# Patient Record
Sex: MALE | Race: WHITE | NOT HISPANIC OR LATINO | Employment: OTHER | URBAN - METROPOLITAN AREA
[De-identification: names, ages, dates, MRNs, and addresses within clinical notes are randomized per-mention and may not be internally consistent; named-entity substitution may affect disease eponyms.]

---

## 2021-03-02 ENCOUNTER — TRANSFERRED RECORDS (OUTPATIENT)
Dept: HEALTH INFORMATION MANAGEMENT | Facility: CLINIC | Age: 61
End: 2021-03-02

## 2022-09-07 ENCOUNTER — TRANSFERRED RECORDS (OUTPATIENT)
Dept: HEALTH INFORMATION MANAGEMENT | Facility: CLINIC | Age: 62
End: 2022-09-07

## 2022-10-14 ENCOUNTER — PRE VISIT (OUTPATIENT)
Dept: ONCOLOGY | Facility: CLINIC | Age: 62
End: 2022-10-14

## 2022-10-14 ENCOUNTER — PATIENT OUTREACH (OUTPATIENT)
Dept: ONCOLOGY | Facility: CLINIC | Age: 62
End: 2022-10-14

## 2022-10-14 ENCOUNTER — TRANSCRIBE ORDERS (OUTPATIENT)
Dept: OTHER | Age: 62
End: 2022-10-14

## 2022-10-14 DIAGNOSIS — C91.10 CLL (CHRONIC LYMPHOCYTIC LEUKEMIA) (H): Primary | ICD-10-CM

## 2022-10-14 NOTE — PROGRESS NOTES
RECORDS STATUS - ALL OTHER DIAGNOSIS      RECORDS RECEIVED FROM: [facility]/ [facility] / [facility]    DATE RECEIVED:11/2/2022    Action    Action Taken 10/14/2022 2:50pm KEB     I called pt Familia - unable to reach pt..     I called [facility] [phone] - I spoke to Yanni in medical records and she will fax records over! They have a [facility] provider in his [facility] records- FRANCK isn't necessary     3:39pm KEB   I faxed [facility] recs to HIM     I called [facility] in [place]      NOTES STATUS DETAILS   OFFICE NOTE from referring provider Self  Dx: CLL, 2nd opinion   Ref to: Dr Kaycee Alaniz / [facility] Onc   Ref by: self-referred   Records: [facility]        OFFICE NOTE from medical oncologist Complete  - [facility]  He started at [facility] back in 2021- 2022   DISCHARGE SUMMARY from hospital     DISCHARGE REPORT from the ER     OPERATIVE REPORT     CLINICAL TRIAL TREATMENTS TO DATE     LABS     PATHOLOGY REPORTS     ANYTHING RELATED TO DIAGNOSIS Complete CE- Labs last updated on 9/8/2022   GENONOMIC TESTING     TYPE:     IMAGING (NEED IMAGES & REPORT)     CT SCANS Complete -[facility] Chest Abdomen Pelvis 9/7/2022   MRI Complete -[facility]   MRI Cervical Spine 9/7/2022   MAMMO     ULTRASOUND     PET

## 2022-10-14 NOTE — PROGRESS NOTES
October 14, 2022    Hematology/Oncology  referral reviewed.   Referral Details    Referred By  Referred To   Call from patient  Dx: CLL, 2nd opinion  Ref to: Dr Kaycee Alaniz / [facility]  Ref by: self-referred  Records: [facility]         Diagnoses: CLL (chronic lymphocytic leukemia) (H)   Order: Adult Oncology/Hematology  Referral    [facility]     Comment: Call from patient       October 18, 2022     records recd by MR team    OUTGOING CALL to pt, Attempt to reach pt x 2, phone rings busy.  Hematology intake scheduling team (NPS phone number [phone] Monday - Friday 8am - 4:30 pm) will contact pt in the next 1-2 business days to schedule the consultation, informing pt that Dr Alaniz no longer sees new pts in [facility] , other provider options are available.    Anna Matthew, RN, BSN, OCN  [facility] Hematology/Oncology Nurse Navigator  [phone]

## 2022-10-31 PROBLEM — C91.10 CLL (CHRONIC LYMPHOCYTIC LEUKEMIA) (H): Status: ACTIVE | Noted: 2022-10-31

## 2022-10-31 NOTE — PROGRESS NOTES
REASON FOR CONSULTATION:  I was asked to see Familia Danielle for recommendations regarding CLL.    HISTORY OF PRESENT ILLNESS:  To review, in February of 2021, a CBC was completed for a long-term physical, which unexpectedly showed an elevated WBC count to 57K, predominantly lymphocytes. Flow cytometry was consistent with CLL, and CT CAP showed no adenopathy or HSM. FISH showed normal cytogenetics. He was diagnosed with Mehta stage 0 CLL, and observed every 3-6 months through September of 2022. His WBC count over that year and a half ranged from 60s-70s. His Hgb and plts were within the normal range, save for a solitary platelet reading of 140 on 9/8/21. He didn't not have particular symptoms related to CLL. In September 2022, his WBC count jumped to 93, and there was concern for progressive fatigue. His Hgb and plts remained unchanged. At that time, there was discussion of initiation of BTKi or Obi/Fer if WBC count or other symptoms worsened. He is here to review his case.    Interval History:  Familia is here today with his girlfriend Jazmine.  He says that overall he is feeling very well.  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  He has no limitations in his activity at all, and has not had any early satiety, weight loss, night sweats, lymphadenopathy or other concerning symptoms.  He is here today to learn more about CLL and obtain a second opinion about treatment.    REVIEW OF SYSTEMS:  A complete review of systems was negative other than noted.    PAST MEDICAL HISTORY:  CLL    MEDICATIONS:  Current Outpatient Medications   Medication     aspirin (ASA) 81 MG EC tablet     Green tea extract     vitamin C (ASCORBIC ACID) 1000 MG TABS     vitamin D3 (CHOLECALCIFEROL) 50 mcg (2000 units) tablet     No current facility-administered medications for this  "visit.     FAMILY HISTORY:  Father with prostate cancer. Diabetes. No other cancer history    SOCIAL HISTORY:  Previous tobacco use (chew), quit 2016. 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    PHYSICAL EXAMINATION:  BP (!) 143/87 (BP Location: Right arm, Patient Position: Sitting, Cuff Size: Adult Large)   Pulse 70   Temp 97.8  F (36.6  C) (Oral)   Ht 1.873 m (6' 1.74\")   Wt 108 kg (238 lb)   SpO2 100%   BMI 30.77 kg/m    Gen: Well appearing, in NAD  HEENT: EOMI, PERRL, mmm, oropharynx clear  LAD: one 1-2cm palpable anterior cervical lymph node on the R. Otherwise no cervical, supraclavicular, axillary or inguinal lymphadenopathy.  CV: Normal rate, regular rhythm. No m/r/g  Pulm: CTAB, no wheezing, normal work of breathing  Abd: Soft, nt/nd, no rebound/guarding  Ext: Warm and well perfused. No lower extremity edema  Skin: No rash, cyanosis or petechial lesion  Neuro: Alert and answering questions appropriately. CNII-XII grossly intact. Moving all extremities without issue or focal neurologic deficits.    Performance status: ECOG 0    LABORATORY DATA:  Previous lab data from [facility] reviewed. CBC from 9/7/22 showed WBC of 93, Hgb 14.4, plts 151. Diff showed 94.9% lymphocytes, with smudge cells.     Recent Labs   Lab Test 11/02/22  1231   WBC 90.3*   HGB 14.7        Recent Labs   Lab Test 11/02/22  1231      POTASSIUM 4.9   CHLORIDE 105   CO2 25   BUN 18.9   CR 1.09   PANFILO 9.1     Recent Labs   Lab Test 11/02/22  1231   AST 26   ALT 13   ALKPHOS 76   BILITOTAL 1.0     IMAGING DATA:  Reviewed in CareEverywhere    IMPRESSION AND PLAN:  Familia Danielle is a 62 year old man with a history of CLL, here for evaluation of possible CLL treatments.    #CLL, Mehta stage 0  Initially diagnosed incidentally in 2/2021 when noted to have lymphocytosis on routine labs. Subsequent flow was consistent with CLL, and CT CAP at that time showed no adenopathy. He has been observed since that " time, with WBC count roughly 60-70s. September of this year (2022), he had a jump in his WBC count to the 90s (95% lymphocytes), and this count is stable today.  He has no symptoms or limitations to speak of, and no adenopathy or other concerning features on exam or labs.  We had a lengthy discussion about the natural history of CLL, as well as indications for treatment versus active surveillance.  At this point, there does not appear to be an indication for treatment, despite a slightly higher lymphocyte count.  We will plan to continue with active surveillance with labs and visits every 3 months for now.  Should he develop cytopenias, or symptoms that warrant treatment, we we will plan to have a discussion about the first line options, including BTK inhibitors and obinutuzumab/venetoclax.    #ID  He has no active infectious issues. He had COVID in 2/2022. We discussed the importance of following up to date local and federal health agency guidance for immunocompromised individuals regarding measures to reduce the risk of COVID-19.  He completed a COVID-19 vaccine (J&J), and we recommended at bivalent booster, along with a flu shot, which he will get today. He completed the Prevnar 20, and we encouraged him to get the Shingrix series at his convenience  .   #Health Maintenance  He will continue to follow with Dr. Marks for primary care (most recent visit 9/8/22).     Follow-up: RV in 3 months with labs prior.     Patient seen and staffed with Dr. Carolina.    I spent 60 minutes in the care of this patient today, which included time necessary for preparation for the visit, obtaining history, ordering medications/tests/procedures as medically indicated, review of pertinent medical literature, counseling of the patient, communication of recommendations to the care team, and documentation time.    Miles Nguyen MD PhD  Heme/Onc/Transplant Fellow  Pgr #[id number]    ATTENDING ATTESTATION:  The patient was seen and  evaluated by me.  I have reviewed the vital signs, medications, labs, and imaging results independently, and have discussed the patient and plan with the resident/fellow, and agree with the findings and plan outlined in this note.  The impression and plan were jointly made.    Mr. Danielle is a 62 year old man with chronic lymphocytic leukemia (CLL).  To summarize his course from the limited records available, he was incidentally noted to have leukocytosis/lymphocytosis with WBC 57 x 10^9/L with normal hemoglobin and plateles around 02/2021.  There were no recurrent CLL associated cytogenetic abnormalities by FISH.  No obvious lymphadenopathy or splenomegaly.  CT chest abdomen and pelvis in 03/2021 was unremarkable.  Clinically Mehta Stage was 0.  Mild fatigue but no obvious CLL complications.  He was monitored without treatment.  Lymphocytosis has progressed up to 90 x 10^9/L range while hemoglobin and platelets have remained normal.  He has been discussing CLL directed therapy with Dr. Deysi Campbell.  Visit for another opinion regarding management of CLL.    He was with SO Jazmine    Plan:     We reviewed his diagnosis and course and general features of CLL and indications for treatment that is palliative short of BMT that is not often indicated    Some progression of lymphocytosis but no other symptoms or complication of CLL    No urgent indication for treatment, we agreed that active surveillance is the best plan for now - could consider BTK inhibitor of venetoclax and CD20 antibody if/when treatment is indicated    He would like to transfer care to our clinic    He got a bivalent COVID booster and flu shot today, plan for Shingrix at local pharmacy or upcoming visit, up to date for pneumococcal vaccines    He will continue to work with Dr. Bowen for health maintenance and other medical issues    Plan for labs and return visit in about 3-4 months    Provided contact info and reminded them to contact us if questions,  concerns, or new issues arise between visits    Total of 60 minutes on patient visit, reviewing records, interpreting test results, placing orders, and documentation on the day of service. +45 minutes reviewing records prior to date of service    Theodore Carolina MD, PhD  Attending Physician, [facility]   of Medicine, [facility]  Division of Hematology, Oncology, and Transplantation  [phone] clinic

## 2022-11-02 ENCOUNTER — ONCOLOGY VISIT (OUTPATIENT)
Dept: ONCOLOGY | Facility: CLINIC | Age: 62
End: 2022-11-02
Attending: INTERNAL MEDICINE
Payer: COMMERCIAL

## 2022-11-02 VITALS
OXYGEN SATURATION: 100 % | WEIGHT: 238 LBS | HEIGHT: 74 IN | TEMPERATURE: 97.8 F | SYSTOLIC BLOOD PRESSURE: 143 MMHG | BODY MASS INDEX: 30.54 KG/M2 | DIASTOLIC BLOOD PRESSURE: 87 MMHG | HEART RATE: 70 BPM

## 2022-11-02 DIAGNOSIS — C91.10 CLL (CHRONIC LYMPHOCYTIC LEUKEMIA) (H): Primary | ICD-10-CM

## 2022-11-02 DIAGNOSIS — Z23 ENCOUNTER FOR IMMUNIZATION: ICD-10-CM

## 2022-11-02 LAB
ALBUMIN SERPL BCG-MCNC: 4.5 G/DL (ref 3.5–5.2)
ALP SERPL-CCNC: 76 U/L (ref 40–129)
ALT SERPL W P-5'-P-CCNC: 13 U/L (ref 10–50)
ANION GAP SERPL CALCULATED.3IONS-SCNC: 10 MMOL/L (ref 7–15)
AST SERPL W P-5'-P-CCNC: 26 U/L (ref 10–50)
BASOPHILS # BLD MANUAL: 0 10E3/UL (ref 0–0.2)
BASOPHILS NFR BLD MANUAL: 0 %
BILIRUB SERPL-MCNC: 1 MG/DL
BUN SERPL-MCNC: 18.9 MG/DL (ref 8–23)
CALCIUM SERPL-MCNC: 9.1 MG/DL (ref 8.8–10.2)
CHLORIDE SERPL-SCNC: 105 MMOL/L (ref 98–107)
CREAT SERPL-MCNC: 1.09 MG/DL (ref 0.67–1.17)
DEPRECATED HCO3 PLAS-SCNC: 25 MMOL/L (ref 22–29)
EOSINOPHIL # BLD MANUAL: 0.9 10E3/UL (ref 0–0.7)
EOSINOPHIL NFR BLD MANUAL: 1 %
ERYTHROCYTE [DISTWIDTH] IN BLOOD BY AUTOMATED COUNT: 12.7 % (ref 10–15)
GFR SERPL CREATININE-BSD FRML MDRD: 76 ML/MIN/1.73M2
GLUCOSE SERPL-MCNC: 100 MG/DL (ref 70–99)
HCT VFR BLD AUTO: 44.4 % (ref 40–53)
HGB BLD-MCNC: 14.7 G/DL (ref 13.3–17.7)
LDH SERPL L TO P-CCNC: NORMAL U/L
LYMPHOCYTES # BLD MANUAL: 83.1 10E3/UL (ref 0.8–5.3)
LYMPHOCYTES NFR BLD MANUAL: 92 %
MCH RBC QN AUTO: 32.2 PG (ref 26.5–33)
MCHC RBC AUTO-ENTMCNC: 33.1 G/DL (ref 31.5–36.5)
MCV RBC AUTO: 97 FL (ref 78–100)
MONOCYTES # BLD MANUAL: 0.9 10E3/UL (ref 0–1.3)
MONOCYTES NFR BLD MANUAL: 1 %
NEUTROPHILS # BLD MANUAL: 5.4 10E3/UL (ref 1.6–8.3)
NEUTROPHILS NFR BLD MANUAL: 6 %
PLAT MORPH BLD: ABNORMAL
PLATELET # BLD AUTO: 159 10E3/UL (ref 150–450)
POTASSIUM SERPL-SCNC: 4.9 MMOL/L (ref 3.4–5.3)
PROT SERPL-MCNC: 7 G/DL (ref 6.4–8.3)
RBC # BLD AUTO: 4.56 10E6/UL (ref 4.4–5.9)
RBC MORPH BLD: ABNORMAL
SODIUM SERPL-SCNC: 140 MMOL/L (ref 136–145)
WBC # BLD AUTO: 90.3 10E3/UL (ref 4–11)

## 2022-11-02 PROCEDURE — 90682 RIV4 VACC RECOMBINANT DNA IM: CPT | Performed by: INTERNAL MEDICINE

## 2022-11-02 PROCEDURE — 36415 COLL VENOUS BLD VENIPUNCTURE: CPT | Performed by: INTERNAL MEDICINE

## 2022-11-02 PROCEDURE — 250N000011 HC RX IP 250 OP 636: Performed by: INTERNAL MEDICINE

## 2022-11-02 PROCEDURE — 83615 LACTATE (LD) (LDH) ENZYME: CPT | Performed by: INTERNAL MEDICINE

## 2022-11-02 PROCEDURE — 99205 OFFICE O/P NEW HI 60 MIN: CPT | Mod: GC | Performed by: INTERNAL MEDICINE

## 2022-11-02 PROCEDURE — 85045 AUTOMATED RETICULOCYTE COUNT: CPT | Performed by: INTERNAL MEDICINE

## 2022-11-02 PROCEDURE — 82040 ASSAY OF SERUM ALBUMIN: CPT | Performed by: INTERNAL MEDICINE

## 2022-11-02 PROCEDURE — G0008 ADMIN INFLUENZA VIRUS VAC: HCPCS | Performed by: INTERNAL MEDICINE

## 2022-11-02 PROCEDURE — G0463 HOSPITAL OUTPT CLINIC VISIT: HCPCS

## 2022-11-02 PROCEDURE — 80053 COMPREHEN METABOLIC PANEL: CPT | Performed by: INTERNAL MEDICINE

## 2022-11-02 PROCEDURE — 91312 HC RX IP 250 OP 636: CPT | Performed by: STUDENT IN AN ORGANIZED HEALTH CARE EDUCATION/TRAINING PROGRAM

## 2022-11-02 PROCEDURE — G0463 HOSPITAL OUTPT CLINIC VISIT: HCPCS | Mod: 25

## 2022-11-02 PROCEDURE — 85027 COMPLETE CBC AUTOMATED: CPT | Performed by: INTERNAL MEDICINE

## 2022-11-02 PROCEDURE — 250N000011 HC RX IP 250 OP 636: Performed by: STUDENT IN AN ORGANIZED HEALTH CARE EDUCATION/TRAINING PROGRAM

## 2022-11-02 PROCEDURE — 85007 BL SMEAR W/DIFF WBC COUNT: CPT | Performed by: INTERNAL MEDICINE

## 2022-11-02 PROCEDURE — 0124A HC ADMIN COVID VAC PFIZER 12+ BIVAL ADDITIONAL: CPT | Performed by: STUDENT IN AN ORGANIZED HEALTH CARE EDUCATION/TRAINING PROGRAM

## 2022-11-02 RX ORDER — CHOLECALCIFEROL (VITAMIN D3) 50 MCG
1 TABLET ORAL DAILY
COMMUNITY

## 2022-11-02 RX ORDER — MULTIVIT WITH MINERALS/LUTEIN
1000 TABLET ORAL DAILY
COMMUNITY

## 2022-11-02 RX ADMIN — INFLUENZA A VIRUS A/WISCONSIN/588/2019 (H1N1) RECOMBINANT HEMAGGLUTININ ANTIGEN, INFLUENZA A VIRUS A/DARWIN/6/2021 (H3N2) RECOMBINANT HEMAGGLUTININ ANTIGEN, INFLUENZA B VIRUS B/AUSTRIA/1359417/2021 RECOMBINANT HEMAGGLUTININ ANTIGEN, AND INFLUENZA B VIRUS B/PHUKET/3073/2013 RECOMBINANT HEMAGGLUTININ ANTIGEN 0.5 ML: 45; 45; 45; 45 INJECTION INTRAMUSCULAR at 12:37

## 2022-11-02 RX ADMIN — BNT162B2 ORIGINAL AND OMICRON BA.4/BA.5 30 MCG: .1125; .1125 INJECTION, SUSPENSION INTRAMUSCULAR at 12:37

## 2022-11-02 ASSESSMENT — PAIN SCALES - GENERAL: PAINLEVEL: NO PAIN (0)

## 2022-11-02 NOTE — NURSING NOTE
Immunizations Administered     Name Date Dose VIS Date Route    COVID-19,PF,Pfizer 12+ YRS BIVALENT Booster 11/2/22 12:37 PM 30 mcg EUA,08/31/2022,Given today Intramuscular    Influenza Quad, Recombinant, p-free 11/2/22 12:37 PM 0.5 mL 08/06/2021, Given Today Intramuscular        Bivalent and Flu shot administered today in clinic setting per Dr. Carolina's orders. Bivalent vaccine given in left deltoid. Flu vaccine given in right deltoid. Pt VIS given for flu, pt declined bivalent VIS. Consent signed and pt showed understanding.   Advised to wait 15 min post injection. Pt showed understanding. Injection was tolerated by patient.    Anna Yen, CMA on 11/2/2022 at 12:45 PM     DISPLAY PLAN FREE TEXT

## 2022-11-02 NOTE — NURSING NOTE
Venipuncture blood draw done on patients Right ac. Patient tolerated well without any complications. 21G needle used. Pt last name and  verified on specimen label and chart. Specimen sent to first floor lab. See flowsheets      Anna Yen CMA on 2022 at 12:42 PM

## 2022-11-02 NOTE — LETTER
11/2/2022         RE: Familia Danielle  [address] [facility] [address]        Dear Colleague,    Thank you for referring your patient, Familia Danielle, to the [facility]. Please see a copy of my visit note below.    REASON FOR CONSULTATION:  I was asked to see Familia Danielle for recommendations regarding CLL.    HISTORY OF PRESENT ILLNESS:  To review, in February of 2021, a CBC was completed for a residential physical, which unexpectedly showed an elevated WBC count to 57K, predominantly lymphocytes. Flow cytometry was consistent with CLL, and CT CAP showed no adenopathy or HSM. FISH showed normal cytogenetics. He was diagnosed with Mehta stage 0 CLL, and observed every 3-6 months through September of 2022. His WBC count over that year and a half ranged from 60s-70s. His Hgb and plts were within the normal range, save for a solitary platelet reading of 140 on 9/8/21. He didn't not have particular symptoms related to CLL. In September 2022, his WBC count jumped to 93, and there was concern for progressive fatigue. His Hgb and plts remained unchanged. At that time, there was discussion of initiation of BTKi or Obi/Fer if WBC count or other symptoms worsened. He is here to review his case.    Interval History:  Familia is here today with his girlfriend Jazmine.  He says that overall he is feeling very well.  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  He has no limitations in his activity at all, and has not had any early satiety, weight loss, night sweats, lymphadenopathy or other concerning symptoms.  He is here today to learn more about CLL and obtain a second opinion about treatment.    REVIEW OF SYSTEMS:  A complete review of systems was negative other than noted.    PAST MEDICAL HISTORY:  CLL    MEDICATIONS:  Current  "Outpatient Medications   Medication     aspirin (ASA) 81 MG EC tablet     Green tea extract     vitamin C (ASCORBIC ACID) 1000 MG TABS     vitamin D3 (CHOLECALCIFEROL) 50 mcg (2000 units) tablet     No current facility-administered medications for this visit.     FAMILY HISTORY:  Father with prostate cancer. Diabetes. No other cancer history    SOCIAL HISTORY:  Previous tobacco use (chew), quit 2016. 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    PHYSICAL EXAMINATION:  BP (!) 143/87 (BP Location: Right arm, Patient Position: Sitting, Cuff Size: Adult Large)   Pulse 70   Temp 97.8  F (36.6  C) (Oral)   Ht 1.873 m (6' 1.74\")   Wt 108 kg (238 lb)   SpO2 100%   BMI 30.77 kg/m    Gen: Well appearing, in NAD  HEENT: EOMI, PERRL, mmm, oropharynx clear  LAD: one 1-2cm palpable anterior cervical lymph node on the R. Otherwise no cervical, supraclavicular, axillary or inguinal lymphadenopathy.  CV: Normal rate, regular rhythm. No m/r/g  Pulm: CTAB, no wheezing, normal work of breathing  Abd: Soft, nt/nd, no rebound/guarding  Ext: Warm and well perfused. No lower extremity edema  Skin: No rash, cyanosis or petechial lesion  Neuro: Alert and answering questions appropriately. CNII-XII grossly intact. Moving all extremities without issue or focal neurologic deficits.    Performance status: ECOG 0    LABORATORY DATA:  Previous lab data from [facility] reviewed. CBC from 9/7/22 showed WBC of 93, Hgb 14.4, plts 151. Diff showed 94.9% lymphocytes, with smudge cells.     Recent Labs   Lab Test 11/02/22  1231   WBC 90.3*   HGB 14.7        Recent Labs   Lab Test 11/02/22  1231      POTASSIUM 4.9   CHLORIDE 105   CO2 25   BUN 18.9   CR 1.09   PANFILO 9.1     Recent Labs   Lab Test 11/02/22  1231   AST 26   ALT 13   ALKPHOS 76   BILITOTAL 1.0     IMAGING DATA:  Reviewed in CareEverywhere    IMPRESSION AND PLAN:  Familia Danielle is a 62 year old man with a history of CLL, here for evaluation of possible " CLL treatments.    #CLL, Mehta stage 0  Initially diagnosed incidentally in 2/2021 when noted to have lymphocytosis on routine labs. Subsequent flow was consistent with CLL, and CT CAP at that time showed no adenopathy. He has been observed since that time, with WBC count roughly 60-70s. September of this year (2022), he had a jump in his WBC count to the 90s (95% lymphocytes), and this count is stable today.  He has no symptoms or limitations to speak of, and no adenopathy or other concerning features on exam or labs.  We had a lengthy discussion about the natural history of CLL, as well as indications for treatment versus active surveillance.  At this point, there does not appear to be an indication for treatment, despite a slightly higher lymphocyte count.  We will plan to continue with active surveillance with labs and visits every 3 months for now.  Should he develop cytopenias, or symptoms that warrant treatment, we we will plan to have a discussion about the first line options, including BTK inhibitors and obinutuzumab/venetoclax.    #ID  He has no active infectious issues. He had COVID in 2/2022. We discussed the importance of following up to date local and federal health agency guidance for immunocompromised individuals regarding measures to reduce the risk of COVID-19.  He completed a COVID-19 vaccine (J&J), and we recommended at bivalent booster, along with a flu shot, which he will get today. He completed the Prevnar 20, and we encouraged him to get the Shingrix series at his convenience  .   #Health Maintenance  He will continue to follow with Dr. Marks for primary care (most recent visit 9/8/22).     Follow-up: RV in 3 months with labs prior.     Patient seen and staffed with Dr. Carolina.    I spent 60 minutes in the care of this patient today, which included time necessary for preparation for the visit, obtaining history, ordering medications/tests/procedures as medically indicated, review of pertinent  medical literature, counseling of the patient, communication of recommendations to the care team, and documentation time.    Miles Nguyen MD PhD  Heme/Onc/Transplant Fellow  Pgr #[id number]    ATTENDING ATTESTATION:  The patient was seen and evaluated by me.  I have reviewed the vital signs, medications, labs, and imaging results independently, and have discussed the patient and plan with the resident/fellow, and agree with the findings and plan outlined in this note.  The impression and plan were jointly made.    Mr. Danielle is a 62 year old man with chronic lymphocytic leukemia (CLL).  To summarize his course from the limited records available, he was incidentally noted to have leukocytosis/lymphocytosis with WBC 57 x 10^9/L with normal hemoglobin and plateles around 02/2021.  There were no recurrent CLL associated cytogenetic abnormalities by FISH.  No obvious lymphadenopathy or splenomegaly.  CT chest abdomen and pelvis in 03/2021 was unremarkable.  Clinically Mehta Stage was 0.  Mild fatigue but no obvious CLL complications.  He was monitored without treatment.  Lymphocytosis has progressed up to 90 x 10^9/L range while hemoglobin and platelets have remained normal.  He has been discussing CLL directed therapy with Dr. Deysi Campbell.  Visit for another opinion regarding management of CLL.    He was with SO Jazmine    Plan:     We reviewed his diagnosis and course and general features of CLL and indications for treatment that is palliative short of BMT that is not often indicated    Some progression of lymphocytosis but no other symptoms or complication of CLL    No urgent indication for treatment, we agreed that active surveillance is the best plan for now - could consider BTK inhibitor of venetoclax and CD20 antibody if/when treatment is indicated    He would like to transfer care to our clinic    He got a bivalent COVID booster and flu shot today, plan for Shingrix at local pharmacy or upcoming visit, up to date for  pneumococcal vaccines    He will continue to work with Dr. Bowen for health maintenance and other medical issues    Plan for labs and return visit in about 3-4 months    Provided contact info and reminded them to contact us if questions, concerns, or new issues arise between visits    Total of 60 minutes on patient visit, reviewing records, interpreting test results, placing orders, and documentation on the day of service. +45 minutes reviewing records prior to date of service    Theodore Carolina MD, PhD  Attending Physician, [facility]   of Medicine, [facility]  Division of Hematology, Oncology, and Transplantation  [phone] [facility]

## 2022-11-02 NOTE — NURSING NOTE
"Oncology Rooming Note    November 2, 2022 11:09 AM   Familia Danielle is a 62 year old male who presents for:    Chief Complaint   Patient presents with     Oncology Clinic Visit     New eval for CLL     Initial Vitals: BP (!) 143/87 (BP Location: Right arm, Patient Position: Sitting, Cuff Size: Adult Large)   Pulse 70   Temp 97.8  F (36.6  C) (Oral)   Ht 1.873 m (6' 1.74\")   Wt 108 kg (238 lb)   SpO2 100%   BMI 30.77 kg/m   Estimated body mass index is 30.77 kg/m  as calculated from the following:    Height as of this encounter: 1.873 m (6' 1.74\").    Weight as of this encounter: 108 kg (238 lb). Body surface area is 2.37 meters squared.  No Pain (0) Comment: Data Unavailable   No LMP for male patient.  Allergies reviewed: Yes  Medications reviewed: Yes    Medications: Medication refills not needed today.  Pharmacy name entered into EPIC: Data Unavailable    Clinical concerns: Patient has no specific questions or clinical concerns outside of the reason for the visit.       Donita Marques, EMT            "

## 2022-11-03 LAB
RETICS # AUTO: 0.07 10E6/UL (ref 0.03–0.1)
RETICS/RBC NFR AUTO: 1.6 % (ref 0.5–2)

## 2022-11-21 NOTE — NURSING NOTE
Met with patient, and SO/Jazmine. Introduced myself, answered questions and provided some basic information on my role as RN Care Coordinator with [facility]. Will continue to follow for ongoing needs. Given writers/clinic resource number.

## 2022-11-26 ENCOUNTER — HEALTH MAINTENANCE LETTER (OUTPATIENT)
Age: 62
End: 2022-11-26

## 2023-03-06 NOTE — PROGRESS NOTES
REASON FOR VISIT:  Management of chronic lymphocytic leukemia (CLL)    HISTORY OF PRESENT ILLNESS:  Familia Danielle is a 63 year old with chronic lymphocytic leukemia (CLL).  To summarize his course, he was incidentally noted to have leukocytosis/lymphocytosis with WBC 57 x 10^9/L with normal hemoglobin and plateles around 02/2021.  There were no recurrent CLL associated cytogenetic abnormalities by FISH.  No obvious lymphadenopathy or splenomegaly.  CT chest abdomen and pelvis in 03/2021 was unremarkable.  Clinically Mehta Stage was 0.  He had mild fatigue but no obvious CLL complications.  He was monitored without treatment.  Lymphocytosis has progressed up to 90 x 10^9/L range while hemoglobin and platelets remained normal.  He had been discussing CLL directed therapy with his primary CLL provider Dr. Deysi Campbell.  We saw him for another opinion and reviewed options and agreed to monitor.  Visit for management of CLL.    He is here with his SO Jazmine.  Had COVID in January and recovered with only mild symptoms. Energy level has been good.  No new night sweats or unintentional weight loss.  No headache or focal weakness or sensory changes.  No dyspnea, cough, or chest pain.  Normal bowel function.  No bleeding or unusual bruising.  No new lumps or bumps.  Had a great time in [place] for about a month.    PAST MEDICAL HISTORY:  No major medical history    MEDICATIONS:  Current Outpatient Medications   Medication     UNABLE TO FIND     vitamin C (ASCORBIC ACID) 1000 MG TABS     vitamin D3 (CHOLECALCIFEROL) 50 mcg (2000 units) tablet     aspirin (ASA) 81 MG EC tablet     No current facility-administered medications for this visit.     SOCIAL HISTORY:  Previous tobacco use (chew), quit 2016. 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    PHYSICAL EXAMINATION:  BP (!) 143/85   Pulse 67   Temp 98  F (36.7  C) (Oral)   Resp 16   Wt 108.6 kg (239 lb 8 oz)   SpO2 97%   BMI 30.97 kg/m    Wt Readings from Last 5  Encounters:   03/08/23 108.6 kg (239 lb 8 oz)   11/02/22 108 kg (238 lb)     General: Well appearing. No distress.  HEENT: Sclerae anicteric.  Lungs: Clear bilaterally without wheezing or crackles.  Heart: Regular rate and rhythm.  Gastrointestinal: Bowel sounds present, no tenderness to palpation, spleen tip not palpable.  Extremities: No lower extremity edema.  Lymph:  No cervical, clavicular, axillary, epitrochlear, or inguinal lymphadenopathy.  Performance status: ECOG 0    LABORATORY DATA: Reviewed by me  Recent Labs   Lab Test 03/08/23  0958 11/02/22  1231   WBC 95.2* 90.3*   HGB 14.5 14.7   * 159   ANEU  --  5.4   ALYM  --  83.1*   RETICABSCT  --  0.073     Recent Labs   Lab Test 03/08/23  0958 11/02/22  1231    140   POTASSIUM 5.2 4.9   CHLORIDE 102 105   CO2 28 25   BUN 17.9 18.9   CR 1.19* 1.09   PANFILO 9.5 9.1     Recent Labs   Lab Test 03/08/23  0958 11/02/22  1231   AST 32 26   ALT 18 13   ALKPHOS 74 76   BILITOTAL 1.0 1.0     IMPRESSION AND PLAN:  Familia Danielle is a 63 year old with chronic lymphocytic leukemia (CLL)    While the ALC has trended up in the past couple of years, hemoglobin and platelets remains normal, and there is no bothersome/threatening lymphadenopathy, symptoms/complications, or any other urgent indication for treatment.  Active surveillance is still a reasonable approach.  We will continue to monitor and he will let us know if anything comes up between visits.    He has no recurrent infections or active ID issues.  He has received Prevnar 20.  I recommended the Shingrix series at his convenience.  He is up to date for COVID-19 vaccines including a bivalent COVID booster.  He already got a seasonal flu shot.     He will continue to work with his primary care provider Dr. Leonard Marks for health maintenance and other medical issues.    We will arrange labs and another visit in about 4 months.  I reminded them to contact us if questions, concerns, or new issues come up between  visits.    Theodore Carolina MD, PhD  Attending Physician, [facility]   of Medicine, [facility]  Division of Hematology, Oncology, and Transplantation  [phone] clinic

## 2023-03-08 ENCOUNTER — PATIENT OUTREACH (OUTPATIENT)
Dept: ONCOLOGY | Facility: CLINIC | Age: 63
End: 2023-03-08

## 2023-03-08 ENCOUNTER — APPOINTMENT (OUTPATIENT)
Dept: LAB | Facility: CLINIC | Age: 63
End: 2023-03-08
Attending: INTERNAL MEDICINE
Payer: COMMERCIAL

## 2023-03-08 ENCOUNTER — ONCOLOGY VISIT (OUTPATIENT)
Dept: ONCOLOGY | Facility: CLINIC | Age: 63
End: 2023-03-08
Attending: INTERNAL MEDICINE
Payer: COMMERCIAL

## 2023-03-08 VITALS
BODY MASS INDEX: 30.97 KG/M2 | TEMPERATURE: 98 F | HEART RATE: 67 BPM | OXYGEN SATURATION: 97 % | SYSTOLIC BLOOD PRESSURE: 143 MMHG | DIASTOLIC BLOOD PRESSURE: 85 MMHG | RESPIRATION RATE: 16 BRPM | WEIGHT: 239.5 LBS

## 2023-03-08 DIAGNOSIS — C91.10 CLL (CHRONIC LYMPHOCYTIC LEUKEMIA) (H): Primary | ICD-10-CM

## 2023-03-08 LAB
ALBUMIN SERPL BCG-MCNC: 4.5 G/DL (ref 3.5–5.2)
ALP SERPL-CCNC: 74 U/L (ref 40–129)
ALT SERPL W P-5'-P-CCNC: 18 U/L (ref 10–50)
ANION GAP SERPL CALCULATED.3IONS-SCNC: 9 MMOL/L (ref 7–15)
AST SERPL W P-5'-P-CCNC: 32 U/L (ref 10–50)
BASOPHILS # BLD MANUAL: 0 10E3/UL (ref 0–0.2)
BASOPHILS NFR BLD MANUAL: 0 %
BILIRUB SERPL-MCNC: 1 MG/DL
BUN SERPL-MCNC: 17.9 MG/DL (ref 8–23)
CALCIUM SERPL-MCNC: 9.5 MG/DL (ref 8.8–10.2)
CHLORIDE SERPL-SCNC: 102 MMOL/L (ref 98–107)
CREAT SERPL-MCNC: 1.19 MG/DL (ref 0.67–1.17)
DEPRECATED HCO3 PLAS-SCNC: 28 MMOL/L (ref 22–29)
EOSINOPHIL # BLD MANUAL: 1 10E3/UL (ref 0–0.7)
EOSINOPHIL NFR BLD MANUAL: 1 %
ERYTHROCYTE [DISTWIDTH] IN BLOOD BY AUTOMATED COUNT: 12.9 % (ref 10–15)
GFR SERPL CREATININE-BSD FRML MDRD: 68 ML/MIN/1.73M2
GLUCOSE SERPL-MCNC: 113 MG/DL (ref 70–99)
HCT VFR BLD AUTO: 44.4 % (ref 40–53)
HGB BLD-MCNC: 14.5 G/DL (ref 13.3–17.7)
LDH SERPL L TO P-CCNC: 229 U/L (ref 0–250)
LYMPHOCYTES # BLD MANUAL: 85.7 10E3/UL (ref 0.8–5.3)
LYMPHOCYTES NFR BLD MANUAL: 90 %
MCH RBC QN AUTO: 31.7 PG (ref 26.5–33)
MCHC RBC AUTO-ENTMCNC: 32.7 G/DL (ref 31.5–36.5)
MCV RBC AUTO: 97 FL (ref 78–100)
MONOCYTES # BLD MANUAL: 2.9 10E3/UL (ref 0–1.3)
MONOCYTES NFR BLD MANUAL: 3 %
NEUTROPHILS # BLD MANUAL: 5.7 10E3/UL (ref 1.6–8.3)
NEUTROPHILS NFR BLD MANUAL: 6 %
PLAT MORPH BLD: ABNORMAL
PLATELET # BLD AUTO: 148 10E3/UL (ref 150–450)
POTASSIUM SERPL-SCNC: 5.2 MMOL/L (ref 3.4–5.3)
PROT SERPL-MCNC: 6.9 G/DL (ref 6.4–8.3)
RBC # BLD AUTO: 4.58 10E6/UL (ref 4.4–5.9)
RBC MORPH BLD: ABNORMAL
SODIUM SERPL-SCNC: 139 MMOL/L (ref 136–145)
TOTAL PROTEIN SERUM FOR ELP: 6.6 G/DL (ref 6.4–8.3)
WBC # BLD AUTO: 95.2 10E3/UL (ref 4–11)

## 2023-03-08 PROCEDURE — 80053 COMPREHEN METABOLIC PANEL: CPT | Performed by: INTERNAL MEDICINE

## 2023-03-08 PROCEDURE — 88184 FLOWCYTOMETRY/ TC 1 MARKER: CPT | Performed by: STUDENT IN AN ORGANIZED HEALTH CARE EDUCATION/TRAINING PROGRAM

## 2023-03-08 PROCEDURE — 88184 FLOWCYTOMETRY/ TC 1 MARKER: CPT | Performed by: INTERNAL MEDICINE

## 2023-03-08 PROCEDURE — 84165 PROTEIN E-PHORESIS SERUM: CPT | Mod: TC | Performed by: PATHOLOGY

## 2023-03-08 PROCEDURE — 84165 PROTEIN E-PHORESIS SERUM: CPT | Mod: 26

## 2023-03-08 PROCEDURE — 85027 COMPLETE CBC AUTOMATED: CPT | Performed by: INTERNAL MEDICINE

## 2023-03-08 PROCEDURE — 36415 COLL VENOUS BLD VENIPUNCTURE: CPT | Performed by: INTERNAL MEDICINE

## 2023-03-08 PROCEDURE — 85007 BL SMEAR W/DIFF WBC COUNT: CPT | Performed by: INTERNAL MEDICINE

## 2023-03-08 PROCEDURE — G0463 HOSPITAL OUTPT CLINIC VISIT: HCPCS | Performed by: INTERNAL MEDICINE

## 2023-03-08 PROCEDURE — 83615 LACTATE (LD) (LDH) ENZYME: CPT | Performed by: INTERNAL MEDICINE

## 2023-03-08 PROCEDURE — 84155 ASSAY OF PROTEIN SERUM: CPT | Performed by: INTERNAL MEDICINE

## 2023-03-08 PROCEDURE — 99214 OFFICE O/P EST MOD 30 MIN: CPT | Performed by: INTERNAL MEDICINE

## 2023-03-08 PROCEDURE — 88188 FLOWCYTOMETRY/READ 9-15: CPT | Performed by: STUDENT IN AN ORGANIZED HEALTH CARE EDUCATION/TRAINING PROGRAM

## 2023-03-08 ASSESSMENT — PAIN SCALES - GENERAL: PAINLEVEL: NO PAIN (0)

## 2023-03-08 NOTE — PROGRESS NOTES
[facility]: Cancer Care Initial Note                                    Discussion with Patient:                                                      Writer met with pt and SO during clinic visit to introduce self and role as RNCC.  Contact information provided for writer.       Goals        General     Functional (pt-stated)      Notes - Note created  3/8/2023  1:19 PM by Joan Flores RN     Goal Statement: I will use my clinic and care team resources as directed.  Date Goal set: 3/8/2023  Barriers: disease burden  Strengths: support, coping, motivation, health awareness, and involvement with care team  Date to Achieve By: ongoing  Patient expressed understanding of goal: Yes  Action steps to achieve this goal:  I will contact triage with new, worsening or uncontrolled symptoms. , I will call with difficulties of scheduling and/or transportation. , I will request needed refills when there are 7 days of medication remaining. , I will not send urgent or symptomatic messages through [facility]. , and I will contact scheduling to arrange or make changes in my appointments.              Assessment:                                                      Initial  Current living arrangement:: I live in a private home  Informal Support system:: Friends;Family;Significant other  Bed or wheelchair confined:: No  Mobility Status: Independent  Transportation means:: Regular car  Medication adherence problem (GOAL):: No  Knowledgeable about how to use meds:: Yes  Medication side effects suspected:: No  Referrals Placed: None  Patient Reported Pain?: No  Pain Score: No Pain (0)    Plan of Care Education Review:   Assessment completed with:: Patient    Current living arrangement  I live in a private home    Plan of Care Education   Yearly learning assessment completed?: Yes (see Education tab)  Diagnosis:: CLL  Does patient understand diagnosis?: Yes  Tx plan/regimen:: surveilance  Does patient understand treatment  plan/regimen?: Yes  Transportation means:: Regular car  Safety/self care at home reviewed with patient:: Yes  Informal Support system:: Friends;Family;Significant other  Coping - concerns/fears reviewed with patient:: Yes  Plan of Care:: Lab appointment;MD follow-up appointment    Evaluation of Learning  Patient Education Provided: No           Intervention/Education provided during outreach:                                                       Patient to follow up as scheduled at next appt  Patient to call/[employer] message with updates  Confirmed patient has clinic and triage numbers    TERESA Hills, RN  RN Care Coordinator  [facility]

## 2023-03-08 NOTE — NURSING NOTE
"Oncology Rooming Note    March 8, 2023 10:07 AM   Familia Danielle is a 63 year old male who presents for:    Chief Complaint   Patient presents with     Blood Draw     Vitals, blood drawn via VPT by LPN. Pt checked into appt.      Oncology Clinic Visit     Return - CLL     Initial Vitals: BP (!) 143/85   Pulse 67   Temp 98  F (36.7  C) (Oral)   Resp 16   Wt 108.6 kg (239 lb 8 oz)   SpO2 97%   BMI 30.97 kg/m   Estimated body mass index is 30.97 kg/m  as calculated from the following:    Height as of 11/2/22: 1.873 m (6' 1.74\").    Weight as of this encounter: 108.6 kg (239 lb 8 oz). Body surface area is 2.38 meters squared.  No Pain (0) Comment: Data Unavailable   No LMP for male patient.  Allergies reviewed: Yes  Medications reviewed: Yes    Medications: Medication refills not needed today.  Pharmacy name entered into EPIC: Data Unavailable    Clinical concerns: No new clinical concerns other than reason for visit today.       Maria D Mancia"

## 2023-03-08 NOTE — NURSING NOTE
Chief Complaint   Patient presents with     Blood Draw     Vitals, blood drawn via VPT by LPN. Pt checked into appt.      ADRIANA Garibay LPN

## 2023-03-08 NOTE — LETTER
3/8/2023         RE: Familia Danielle  [address] [place] [facility] [address]        Dear Colleague,    Thank you for referring your patient, Familia Danielle, to the [facility] CANCER [facility]. Please see a copy of my visit note below.    REASON FOR VISIT:  Management of chronic lymphocytic leukemia (CLL)    HISTORY OF PRESENT ILLNESS:  Familia Danielle is a 63 year old with chronic lymphocytic leukemia (CLL).  To summarize his course, he was incidentally noted to have leukocytosis/lymphocytosis with WBC 57 x 10^9/L with normal hemoglobin and plateles around 02/2021.  There were no recurrent CLL associated cytogenetic abnormalities by FISH.  No obvious lymphadenopathy or splenomegaly.  CT chest abdomen and pelvis in 03/2021 was unremarkable.  Clinically Mehta Stage was 0.  He had mild fatigue but no obvious CLL complications.  He was monitored without treatment.  Lymphocytosis has progressed up to 90 x 10^9/L range while hemoglobin and platelets remained normal.  He had been discussing CLL directed therapy with his primary CLL provider Dr. Deysi Campbell.  We saw him for another opinion and reviewed options and agreed to monitor.  Visit for management of CLL.    He is here with his SO Jazmine.  Had COVID in January and recovered with only mild symptoms. Energy level has been good.  No new night sweats or unintentional weight loss.  No headache or focal weakness or sensory changes.  No dyspnea, cough, or chest pain.  Normal bowel function.  No bleeding or unusual bruising.  No new lumps or bumps.  Had a great time in [place] for about a month.    PAST MEDICAL HISTORY:  No major medical history    MEDICATIONS:  Current Outpatient Medications   Medication     UNABLE TO FIND     vitamin C (ASCORBIC ACID) 1000 MG TABS     vitamin D3 (CHOLECALCIFEROL) 50 mcg (2000 units) tablet     aspirin (ASA) 81 MG EC tablet     No current facility-administered medications for this visit.     SOCIAL HISTORY:  Previous tobacco use (chew), quit  2016. 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    PHYSICAL EXAMINATION:  BP (!) 143/85   Pulse 67   Temp 98  F (36.7  C) (Oral)   Resp 16   Wt 108.6 kg (239 lb 8 oz)   SpO2 97%   BMI 30.97 kg/m    Wt Readings from Last 5 Encounters:   03/08/23 108.6 kg (239 lb 8 oz)   11/02/22 108 kg (238 lb)     General: Well appearing. No distress.  HEENT: Sclerae anicteric.  Lungs: Clear bilaterally without wheezing or crackles.  Heart: Regular rate and rhythm.  Gastrointestinal: Bowel sounds present, no tenderness to palpation, spleen tip not palpable.  Extremities: No lower extremity edema.  Lymph:  No cervical, clavicular, axillary, epitrochlear, or inguinal lymphadenopathy.  Performance status: ECOG 0    LABORATORY DATA: Reviewed by me  Recent Labs   Lab Test 03/08/23  0958 11/02/22  1231   WBC 95.2* 90.3*   HGB 14.5 14.7   * 159   ANEU  --  5.4   ALYM  --  83.1*   RETICABSCT  --  0.073     Recent Labs   Lab Test 03/08/23  0958 11/02/22  1231    140   POTASSIUM 5.2 4.9   CHLORIDE 102 105   CO2 28 25   BUN 17.9 18.9   CR 1.19* 1.09   PANFILO 9.5 9.1     Recent Labs   Lab Test 03/08/23  0958 11/02/22  1231   AST 32 26   ALT 18 13   ALKPHOS 74 76   BILITOTAL 1.0 1.0     IMPRESSION AND PLAN:  Familia Danielle is a 63 year old with chronic lymphocytic leukemia (CLL)    While the ALC has trended up in the past couple of years, hemoglobin and platelets remains normal, and there is no bothersome/threatening lymphadenopathy, symptoms/complications, or any other urgent indication for treatment.  Active surveillance is still a reasonable approach.  We will continue to monitor and he will let us know if anything comes up between visits.    He has no recurrent infections or active ID issues.  He has received Prevnar 20.  I recommended the Shingrix series at his convenience.  He is up to date for COVID-19 vaccines including a bivalent COVID booster.  He already got a seasonal flu shot.     He will  continue to work with his primary care provider Dr. Leonard Marks for health maintenance and other medical issues.    We will arrange labs and another visit in about 4 months.  I reminded them to contact us if questions, concerns, or new issues come up between visits.    Theodore Carolina MD, PhD  Attending Physician, [facility]   of Medicine, [facility]  Division of Hematology, Oncology, and Transplantation  [phone] clinic

## 2023-03-09 LAB
ALBUMIN SERPL ELPH-MCNC: 4.4 G/DL (ref 3.7–5.1)
ALPHA1 GLOB SERPL ELPH-MCNC: 0.2 G/DL (ref 0.2–0.4)
ALPHA2 GLOB SERPL ELPH-MCNC: 0.6 G/DL (ref 0.5–0.9)
B-GLOBULIN SERPL ELPH-MCNC: 0.6 G/DL (ref 0.6–1)
GAMMA GLOB SERPL ELPH-MCNC: 0.8 G/DL (ref 0.7–1.6)
M PROTEIN SERPL ELPH-MCNC: 0 G/DL
PATH REPORT.COMMENTS IMP SPEC: ABNORMAL
PATH REPORT.COMMENTS IMP SPEC: YES
PATH REPORT.FINAL DX SPEC: ABNORMAL
PATH REPORT.MICROSCOPIC SPEC OTHER STN: ABNORMAL
PATH REPORT.RELEVANT HX SPEC: ABNORMAL
PROT PATTERN SERPL ELPH-IMP: NORMAL

## 2023-07-17 NOTE — PROGRESS NOTES
REASON FOR VISIT:  Management of chronic lymphocytic leukemia (CLL)    HISTORY OF PRESENT ILLNESS:  Familia Danielle is a 63 year old with chronic lymphocytic leukemia (CLL).  To summarize his course, he was incidentally noted to have leukocytosis/lymphocytosis with WBC 57 x 10^9/L with normal hemoglobin and plateles around 02/2021.  There were no recurrent CLL associated cytogenetic abnormalities by FISH.  No obvious lymphadenopathy or splenomegaly.  CT chest abdomen and pelvis in 03/2021 was unremarkable.  Clinically Mehta Stage was 0.  He had mild fatigue but no obvious CLL complications.  He was monitored without treatment.  Lymphocytosis has progressed up to 90 x 10^9/L range while hemoglobin and platelets remained normal.  He had been discussing CLL directed therapy with his previous CLL provider Dr. Deysi Campbell.  We saw him for another opinion and reviewed options and agreed to monitor with active surveillance.  Visit for management of CLL.    He is here with his SO Jazmine.  No new night sweats or unintentional weight loss.  No headache or focal weakness or sensory changes.  No dyspnea, cough, or chest pain.  Normal bowel function.  No bleeding or unusual bruising.  No new lumps or bumps.  [place]  Had a tick bite (Wood tick) and still has a little bump but no pain or erythema.    PAST MEDICAL HISTORY:  No major medical history    MEDICATIONS:  Current Outpatient Medications   Medication     vitamin C (ASCORBIC ACID) 1000 MG TABS     vitamin D3 (CHOLECALCIFEROL) 50 mcg (2000 units) tablet     No current facility-administered medications for this visit.     SOCIAL HISTORY:  Previous tobacco use (chew), quit 2016. 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    PHYSICAL EXAMINATION:  /79   Pulse 74   Temp 98  F (36.7  C) (Oral)   Resp 16   Wt 103 kg (227 lb)   SpO2 98%   BMI 29.35 kg/m    Wt Readings from Last 5 Encounters:   07/19/23 103 kg (227 lb)    03/08/23 108.6 kg (239 lb 8 oz)   11/02/22 108 kg (238 lb)     General: Well appearing. No distress.  HEENT: Sclerae anicteric.  Lungs: Clear bilaterally without wheezing or crackles.  Heart: Regular rate and rhythm.  Gastrointestinal: Bowel sounds present, no tenderness to palpation, spleen tip not palpable.  Extremities: No lower extremity edema.  Lymph:  No cervical, clavicular, axillary, epitrochlear, or inguinal lymphadenopathy.  Skin: ~1cm firm area on waistline where he removed a tick with no erythema, some resolving bruise  Performance status: ECOG 0    LABORATORY DATA: Reviewed by me  Recent Labs   Lab Test 07/19/23  0935 03/08/23  0958 11/02/22  1231   .1* 95.2* 90.3*   HGB 14.5 14.5 14.7    148* 159   ANEU 2.0 5.7 5.4   ALYM 97.1* 85.7* 83.1*   RETICABSCT  --   --  0.073     Recent Labs   Lab Test 07/19/23  0935 03/08/23  0958 11/02/22  1231    139 140   POTASSIUM 5.5* 5.2 4.9   CHLORIDE 104 102 105   CO2 25 28 25   BUN 15.1 17.9 18.9   CR 1.14 1.19* 1.09   PANFILO 9.0 9.5 9.1    229  --      Recent Labs   Lab Test 07/19/23  0935 03/08/23  0958 11/02/22  1231   AST 28 32 26   ALT 13 18 13   ALKPHOS 76 74 76   BILITOTAL 1.2 1.0 1.0     IMPRESSION AND PLAN:  Familia Danielle is a 63 year old with chronic lymphocytic leukemia (CLL)    While the ALC has trended up in the past couple of years, hemoglobin and platelets remains normal, and there is no bothersome/threatening lymphadenopathy, symptoms/complications, or any other urgent indication for treatment. We will continue active surveillance, and he will let us know if anything comes up between visits.    He has no recurrent infections or active ID issues.  He has received Prevnar 20.  I have recommended the Shingrix series at his convenience.  We reviewed the current COVID-19 vaccine guidelines.      His mild hyperkalemia is very likely pseudohyperkalemia related to leukocytosis/lymphocytosis - no symptoms, renal function good, not on any  meds.  He will continue to work with his primary care provider Dr. Leonard Marks for health maintenance and other medical issues.    We will arrange labs and another visit in about 4 months.  I reminded them to contact us if questions, concerns, or new issues come up between visits.    Total of 30 minutes on patient visit, reviewing records, interpreting test results, placing orders, and documentation on the day of service.    Theodore Carolina MD, PhD  Attending Physician, [facility]  [phone] [facility]

## 2023-07-19 ENCOUNTER — APPOINTMENT (OUTPATIENT)
Dept: LAB | Facility: CLINIC | Age: 63
End: 2023-07-19
Attending: INTERNAL MEDICINE
Payer: COMMERCIAL

## 2023-07-19 ENCOUNTER — ONCOLOGY VISIT (OUTPATIENT)
Dept: ONCOLOGY | Facility: CLINIC | Age: 63
End: 2023-07-19
Attending: INTERNAL MEDICINE
Payer: COMMERCIAL

## 2023-07-19 VITALS
WEIGHT: 227 LBS | RESPIRATION RATE: 16 BRPM | BODY MASS INDEX: 29.35 KG/M2 | OXYGEN SATURATION: 98 % | HEART RATE: 74 BPM | DIASTOLIC BLOOD PRESSURE: 79 MMHG | SYSTOLIC BLOOD PRESSURE: 124 MMHG | TEMPERATURE: 98 F

## 2023-07-19 DIAGNOSIS — C91.10 CLL (CHRONIC LYMPHOCYTIC LEUKEMIA) (H): Primary | ICD-10-CM

## 2023-07-19 LAB
ALBUMIN SERPL BCG-MCNC: 4.5 G/DL (ref 3.5–5.2)
ALP SERPL-CCNC: 76 U/L (ref 40–129)
ALT SERPL W P-5'-P-CCNC: 13 U/L (ref 0–70)
ANION GAP SERPL CALCULATED.3IONS-SCNC: 9 MMOL/L (ref 7–15)
AST SERPL W P-5'-P-CCNC: 28 U/L (ref 0–45)
BASOPHILS # BLD MANUAL: 0 10E3/UL (ref 0–0.2)
BASOPHILS NFR BLD MANUAL: 0 %
BILIRUB SERPL-MCNC: 1.2 MG/DL
BUN SERPL-MCNC: 15.1 MG/DL (ref 8–23)
CALCIUM SERPL-MCNC: 9 MG/DL (ref 8.8–10.2)
CHLORIDE SERPL-SCNC: 104 MMOL/L (ref 98–107)
CREAT SERPL-MCNC: 1.14 MG/DL (ref 0.67–1.17)
DEPRECATED HCO3 PLAS-SCNC: 25 MMOL/L (ref 22–29)
EOSINOPHIL # BLD MANUAL: 0 10E3/UL (ref 0–0.7)
EOSINOPHIL NFR BLD MANUAL: 0 %
ERYTHROCYTE [DISTWIDTH] IN BLOOD BY AUTOMATED COUNT: 12.9 % (ref 10–15)
GFR SERPL CREATININE-BSD FRML MDRD: 72 ML/MIN/1.73M2
GLUCOSE SERPL-MCNC: 111 MG/DL (ref 70–99)
HCT VFR BLD AUTO: 45 % (ref 40–53)
HGB BLD-MCNC: 14.5 G/DL (ref 13.3–17.7)
LDH SERPL L TO P-CCNC: 193 U/L (ref 0–250)
LYMPHOCYTES # BLD MANUAL: 97.1 10E3/UL (ref 0.8–5.3)
LYMPHOCYTES NFR BLD MANUAL: 97 %
MCH RBC QN AUTO: 31.9 PG (ref 26.5–33)
MCHC RBC AUTO-ENTMCNC: 32.2 G/DL (ref 31.5–36.5)
MCV RBC AUTO: 99 FL (ref 78–100)
MONOCYTES # BLD MANUAL: 1 10E3/UL (ref 0–1.3)
MONOCYTES NFR BLD MANUAL: 1 %
NEUTROPHILS # BLD MANUAL: 2 10E3/UL (ref 1.6–8.3)
NEUTROPHILS NFR BLD MANUAL: 2 %
PLAT MORPH BLD: ABNORMAL
PLATELET # BLD AUTO: 153 10E3/UL (ref 150–450)
POTASSIUM SERPL-SCNC: 5.5 MMOL/L (ref 3.4–5.3)
PROT SERPL-MCNC: 6.8 G/DL (ref 6.4–8.3)
RBC # BLD AUTO: 4.55 10E6/UL (ref 4.4–5.9)
RBC MORPH BLD: ABNORMAL
SODIUM SERPL-SCNC: 138 MMOL/L (ref 136–145)
WBC # BLD AUTO: 100.1 10E3/UL (ref 4–11)

## 2023-07-19 PROCEDURE — 36415 COLL VENOUS BLD VENIPUNCTURE: CPT | Performed by: INTERNAL MEDICINE

## 2023-07-19 PROCEDURE — 99214 OFFICE O/P EST MOD 30 MIN: CPT | Performed by: INTERNAL MEDICINE

## 2023-07-19 PROCEDURE — G0463 HOSPITAL OUTPT CLINIC VISIT: HCPCS | Performed by: INTERNAL MEDICINE

## 2023-07-19 PROCEDURE — 85007 BL SMEAR W/DIFF WBC COUNT: CPT | Performed by: INTERNAL MEDICINE

## 2023-07-19 PROCEDURE — 83615 LACTATE (LD) (LDH) ENZYME: CPT | Performed by: INTERNAL MEDICINE

## 2023-07-19 PROCEDURE — 85027 COMPLETE CBC AUTOMATED: CPT | Performed by: INTERNAL MEDICINE

## 2023-07-19 PROCEDURE — 80053 COMPREHEN METABOLIC PANEL: CPT | Performed by: INTERNAL MEDICINE

## 2023-07-19 ASSESSMENT — PAIN SCALES - GENERAL: PAINLEVEL: NO PAIN (0)

## 2023-07-19 NOTE — NURSING NOTE
"Oncology Rooming Note    July 19, 2023 9:50 AM   Familia Danielle is a 63 year old male who presents for:    Chief Complaint   Patient presents with     Blood Draw     Labs drawn via  by RN in lab. VS taken.      Oncology Clinic Visit     CLL     Initial Vitals: /79   Pulse 74   Temp 98  F (36.7  C) (Oral)   Resp 16   Wt 103 kg (227 lb)   SpO2 98%   BMI 29.35 kg/m   Estimated body mass index is 29.35 kg/m  as calculated from the following:    Height as of 11/2/22: 1.873 m (6' 1.74\").    Weight as of this encounter: 103 kg (227 lb). Body surface area is 2.31 meters squared.  No Pain (0) Comment: Data Unavailable   No LMP for male patient.  Allergies reviewed: Yes  Medications reviewed: Yes    Medications: Medication refills not needed today.  Pharmacy name entered into EPIC: Data Unavailable    Clinical concerns: none.       Anna Yen CMA            "

## 2023-07-19 NOTE — LETTER
7/19/2023         RE: Familia Danielle  [address] [place] [facility] [address]        Dear Colleague,    Thank you for referring your patient, Familia Danielle, to the [facility] CANCER CLINIC. Please see a copy of my visit note below.    REASON FOR VISIT:  Management of chronic lymphocytic leukemia (CLL)    HISTORY OF PRESENT ILLNESS:  Familia Danielle is a 63 year old with chronic lymphocytic leukemia (CLL).  To summarize his course, he was incidentally noted to have leukocytosis/lymphocytosis with WBC 57 x 10^9/L with normal hemoglobin and plateles around 02/2021.  There were no recurrent CLL associated cytogenetic abnormalities by FISH.  No obvious lymphadenopathy or splenomegaly.  CT chest abdomen and pelvis in 03/2021 was unremarkable.  Clinically Mehta Stage was 0.  He had mild fatigue but no obvious CLL complications.  He was monitored without treatment.  Lymphocytosis has progressed up to 90 x 10^9/L range while hemoglobin and platelets remained normal.  He had been discussing CLL directed therapy with his previous CLL provider Dr. Deysi Campbell.  We saw him for another opinion and reviewed options and agreed to monitor with active surveillance.  Visit for management of CLL.    He is here with his SO Jzamine.  No new night sweats or unintentional weight loss.  No headache or focal weakness or sensory changes.  No dyspnea, cough, or chest pain.  Normal bowel function.  No bleeding or unusual bruising.  No new lumps or bumps.  [place]  Had a tick bite (Wood tick) and still has a little bump but no pain or erythema.    PAST MEDICAL HISTORY:  No major medical history    MEDICATIONS:  Current Outpatient Medications   Medication    vitamin C (ASCORBIC ACID) 1000 MG TABS    vitamin D3 (CHOLECALCIFEROL) 50 mcg (2000 units) tablet     No current facility-administered medications for this visit.     SOCIAL HISTORY:  Previous tobacco use (chew), quit 2016. 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    PHYSICAL EXAMINATION:  /79   Pulse 74   Temp 98  F (36.7  C) (Oral)   Resp 16   Wt 103 kg (227 lb)   SpO2 98%   BMI 29.35 kg/m    Wt Readings from Last 5 Encounters:   07/19/23 103 kg (227 lb)   03/08/23 108.6 kg (239 lb 8 oz)   11/02/22 108 kg (238 lb)     General: Well appearing. No distress.  HEENT: Sclerae anicteric.  Lungs: Clear bilaterally without wheezing or crackles.  Heart: Regular rate and rhythm.  Gastrointestinal: Bowel sounds present, no tenderness to palpation, spleen tip not palpable.  Extremities: No lower extremity edema.  Lymph:  No cervical, clavicular, axillary, epitrochlear, or inguinal lymphadenopathy.  Skin: ~1cm firm area on waistline where he removed a tick with no erythema, some resolving bruise  Performance status: ECOG 0    LABORATORY DATA: Reviewed by me  Recent Labs   Lab Test 07/19/23  0935 03/08/23  0958 11/02/22  1231   .1* 95.2* 90.3*   HGB 14.5 14.5 14.7    148* 159   ANEU 2.0 5.7 5.4   ALYM 97.1* 85.7* 83.1*   RETICABSCT  --   --  0.073     Recent Labs   Lab Test 07/19/23  0935 03/08/23  0958 11/02/22  1231    139 140   POTASSIUM 5.5* 5.2 4.9   CHLORIDE 104 102 105   CO2 25 28 25   BUN 15.1 17.9 18.9   CR 1.14 1.19* 1.09   PANFILO 9.0 9.5 9.1    229  --      Recent Labs   Lab Test 07/19/23  0935 03/08/23  0958 11/02/22  1231   AST 28 32 26   ALT 13 18 13   ALKPHOS 76 74 76   BILITOTAL 1.2 1.0 1.0     IMPRESSION AND PLAN:  Familia Danielle is a 63 year old with chronic lymphocytic leukemia (CLL)    While the ALC has trended up in the past couple of years, hemoglobin and platelets remains normal, and there is no bothersome/threatening lymphadenopathy, symptoms/complications, or any other urgent indication for treatment. We will continue active surveillance, and he will let us know if anything comes up between visits.    He has no recurrent infections or active ID issues.  He has received Prevnar 20.  I  have recommended the Shingrix series at his convenience.  We reviewed the current COVID-19 vaccine guidelines.      His mild hyperkalemia is very likely pseudohyperkalemia related to leukocytosis/lymphocytosis - no symptoms, renal function good, not on any meds.  He will continue to work with his primary care provider Dr. Leonard Marks for health maintenance and other medical issues.    We will arrange labs and another visit in about 4 months.  I reminded them to contact us if questions, concerns, or new issues come up between visits.    Total of 30 minutes on patient visit, reviewing records, interpreting test results, placing orders, and documentation on the day of service.    Theodore Carolina MD, PhD  Attending Physician, [facility]  [phone] [facility]

## 2023-11-12 NOTE — PROGRESS NOTES
REASON FOR VISIT:  Management of chronic lymphocytic leukemia (CLL)    HISTORY OF PRESENT ILLNESS:  Familia Danielle is a 63 year old with chronic lymphocytic leukemia (CLL).  To summarize his course, he was incidentally noted to have leukocytosis/lymphocytosis with WBC 57 x 10^9/L with normal hemoglobin and plateles around 02/2021.  There were no recurrent CLL associated cytogenetic abnormalities by FISH.  No obvious lymphadenopathy or splenomegaly.  CT chest abdomen and pelvis in 03/2021 was unremarkable.  Clinically Mehta Stage was 0.  He had mild fatigue but no obvious CLL complications.  He was monitored without treatment.  Lymphocytosis has progressed up to 90 x 10^9/L range while hemoglobin and platelets remained normal.  He had been discussing CLL directed therapy with his previous CLL provider Dr. Deysi Campbell.  We saw him for another opinion and reviewed options and agreed to monitor with active surveillance.  Visit for management of CLL.    He is here with his SO Jazmine.  No new night sweats or unintentional weight loss.  No headache or focal weakness or sensory changes.  No dyspnea, cough, or chest pain.  Normal bowel function.  No bleeding or unusual bruising.  No new lumps or bumps.  Going to [place] for about a month in January.  No concerns today.    PAST MEDICAL HISTORY:  No major medical history    MEDICATIONS:  Current Outpatient Medications   Medication    vitamin C (ASCORBIC ACID) 1000 MG TABS    vitamin D3 (CHOLECALCIFEROL) 50 mcg (2000 units) tablet     Current Facility-Administered Medications   Medication    COVID-19 mRNA vaccine 12+y (COMIRNATY (PFIZER)) injection 30 mcg    COVID-19 mRNA vaccine 12+y (COMIRNATY (PFIZER)) injection 30 mcg    diphenhydrAMINE (BENADRYL) capsule 50 mg    Or    diphenhydrAMINE (BENADRYL) injection 50 mg    EPINEPHrine (Anaphylaxis) (ADRENALIN) injection (vial) 0.3 mg    influenza recomb quadrivalent PF (FLUBLOK) injection 0.5 mL     SOCIAL HISTORY:  Previous tobacco use (chew),  quit 2016. 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    PHYSICAL EXAMINATION:  /81 (BP Location: Right arm, Patient Position: Sitting, Cuff Size: Adult Regular)   Pulse 79   Temp 97.5  F (36.4  C) (Oral)   Resp 18   Wt 103.7 kg (228 lb 9.6 oz)   SpO2 98%   BMI 29.56 kg/m    Wt Readings from Last 5 Encounters:   11/15/23 103.7 kg (228 lb 9.6 oz)   07/19/23 103 kg (227 lb)   03/08/23 108.6 kg (239 lb 8 oz)   11/02/22 108 kg (238 lb)     General: Well appearing. No distress.  HEENT: Sclerae anicteric.  Lungs: Clear bilaterally without wheezing or crackles.  Heart: Regular rate and rhythm.  Gastrointestinal: Bowel sounds present, no tenderness to palpation, spleen tip not palpable.  Extremities: No lower extremity edema.  Lymph:  No cervical, clavicular, axillary, epitrochlear, or inguinal lymphadenopathy.  Skin: ~1cm firm area on waistline where he removed a tick with no erythema, some resolving bruise  Performance status: ECOG 0    LABORATORY DATA: Reviewed by me  Recent Labs   Lab Test 11/15/23  0942 07/19/23  0935 03/08/23  0958 11/02/22  1231   .9* 100.1* 95.2* 90.3*   HGB 15.7 14.5 14.5 14.7   * 153 148* 159   ANEU  --  2.0 5.7 5.4   ALYM  --  97.1* 85.7* 83.1*   RETICABSCT  --   --   --  0.073     Recent Labs   Lab Test 11/15/23  0942 07/19/23  0935 03/08/23  0958    138 139   POTASSIUM 5.4* 5.5* 5.2   CHLORIDE 101 104 102   CO2 27 25 28   BUN 21.6 15.1 17.9   CR 1.30* 1.14 1.19*   PANFILO 9.7 9.0 9.5    193 229     Recent Labs   Lab Test 11/15/23  0942 07/19/23  0935 03/08/23  0958   AST 24 28 32   ALT 13 13 18   ALKPHOS 92 76 74   BILITOTAL 0.8 1.2 1.0     IMPRESSION AND PLAN:  Familia Danielle is a 63 year old with chronic lymphocytic leukemia (CLL)    While the ALC has trended up in the past couple of years, hemoglobin and platelets remains normal, and there is no bothersome/threatening lymphadenopathy, symptoms/complications, or any other urgent  indication for CLL treatment. We will continue active surveillance, and he will let us know if anything comes up between visits.    He has no recurrent infections or active ID issues.  He has received Prevnar 20.  He got the 1st dose of the Shingrix series 10/30/2023.  We reviewed the current COVID-19 vaccine guidelines.  He got a flu shot and updated COVID-19 vaccine today.  I recommended an RSV vaccine.      His mild hyperkalemia is very likely pseudohyperkalemia related to leukocytosis/lymphocytosis - no symptoms, renal function good, not on any meds.  He will continue to work with his primary care provider Dr. Leonard Marks for health maintenance and other medical issues.    We will arrange labs and another visit in about 4 months.  I reminded them to contact us if questions, concerns, or new issues come up between visits.    Total of 30 minutes on patient visit, reviewing records, interpreting test results, placing orders, and documentation on the day of service.    Theodore Carolina MD, PhD  Attending Physician, [facility]  [phone] [facility]

## 2023-11-15 ENCOUNTER — APPOINTMENT (OUTPATIENT)
Dept: LAB | Facility: CLINIC | Age: 63
End: 2023-11-15
Attending: INTERNAL MEDICINE
Payer: COMMERCIAL

## 2023-11-15 ENCOUNTER — ONCOLOGY VISIT (OUTPATIENT)
Dept: ONCOLOGY | Facility: CLINIC | Age: 63
End: 2023-11-15
Attending: INTERNAL MEDICINE
Payer: COMMERCIAL

## 2023-11-15 VITALS
WEIGHT: 228.6 LBS | RESPIRATION RATE: 18 BRPM | BODY MASS INDEX: 29.56 KG/M2 | HEART RATE: 79 BPM | TEMPERATURE: 97.5 F | DIASTOLIC BLOOD PRESSURE: 81 MMHG | SYSTOLIC BLOOD PRESSURE: 137 MMHG | OXYGEN SATURATION: 98 %

## 2023-11-15 DIAGNOSIS — Z23 NEED FOR VACCINATION: ICD-10-CM

## 2023-11-15 DIAGNOSIS — Z23 NEED FOR PROPHYLACTIC VACCINATION AND INOCULATION AGAINST INFLUENZA: ICD-10-CM

## 2023-11-15 DIAGNOSIS — C91.10 CLL (CHRONIC LYMPHOCYTIC LEUKEMIA) (H): Primary | ICD-10-CM

## 2023-11-15 LAB
ALBUMIN SERPL BCG-MCNC: 4.6 G/DL (ref 3.5–5.2)
ALP SERPL-CCNC: 92 U/L (ref 40–150)
ALT SERPL W P-5'-P-CCNC: 13 U/L (ref 0–70)
ANION GAP SERPL CALCULATED.3IONS-SCNC: 11 MMOL/L (ref 7–15)
AST SERPL W P-5'-P-CCNC: 24 U/L (ref 0–45)
BASOPHILS # BLD AUTO: ABNORMAL 10*3/UL
BASOPHILS # BLD MANUAL: 0 10E3/UL (ref 0–0.2)
BASOPHILS NFR BLD AUTO: ABNORMAL %
BASOPHILS NFR BLD MANUAL: 0 %
BILIRUB SERPL-MCNC: 0.8 MG/DL
BUN SERPL-MCNC: 21.6 MG/DL (ref 8–23)
CALCIUM SERPL-MCNC: 9.7 MG/DL (ref 8.8–10.2)
CHLORIDE SERPL-SCNC: 101 MMOL/L (ref 98–107)
CREAT SERPL-MCNC: 1.3 MG/DL (ref 0.67–1.17)
DEPRECATED HCO3 PLAS-SCNC: 27 MMOL/L (ref 22–29)
EGFRCR SERPLBLD CKD-EPI 2021: 61 ML/MIN/1.73M2
EOSINOPHIL # BLD AUTO: ABNORMAL 10*3/UL
EOSINOPHIL # BLD MANUAL: 0 10E3/UL (ref 0–0.7)
EOSINOPHIL NFR BLD AUTO: ABNORMAL %
EOSINOPHIL NFR BLD MANUAL: 0 %
ERYTHROCYTE [DISTWIDTH] IN BLOOD BY AUTOMATED COUNT: 12.5 % (ref 10–15)
GLUCOSE SERPL-MCNC: 113 MG/DL (ref 70–99)
HCT VFR BLD AUTO: 47.8 % (ref 40–53)
HGB BLD-MCNC: 15.7 G/DL (ref 13.3–17.7)
IMM GRANULOCYTES # BLD: ABNORMAL 10*3/UL
IMM GRANULOCYTES NFR BLD: ABNORMAL %
LDH SERPL L TO P-CCNC: 178 U/L (ref 0–250)
LYMPHOCYTES # BLD AUTO: ABNORMAL 10*3/UL
LYMPHOCYTES # BLD MANUAL: 98.5 10E3/UL (ref 0.8–5.3)
LYMPHOCYTES NFR BLD AUTO: ABNORMAL %
LYMPHOCYTES NFR BLD MANUAL: 93 %
MCH RBC QN AUTO: 32.1 PG (ref 26.5–33)
MCHC RBC AUTO-ENTMCNC: 32.8 G/DL (ref 31.5–36.5)
MCV RBC AUTO: 98 FL (ref 78–100)
MONOCYTES # BLD AUTO: ABNORMAL 10*3/UL
MONOCYTES # BLD MANUAL: 2.1 10E3/UL (ref 0–1.3)
MONOCYTES NFR BLD AUTO: ABNORMAL %
MONOCYTES NFR BLD MANUAL: 2 %
NEUTROPHILS # BLD AUTO: ABNORMAL 10*3/UL
NEUTROPHILS # BLD MANUAL: 5.3 10E3/UL (ref 1.6–8.3)
NEUTROPHILS NFR BLD AUTO: ABNORMAL %
NEUTROPHILS NFR BLD MANUAL: 5 %
NRBC # BLD AUTO: 0 10E3/UL
NRBC BLD AUTO-RTO: 0 /100
PLAT MORPH BLD: ABNORMAL
PLATELET # BLD AUTO: 146 10E3/UL (ref 150–450)
POTASSIUM SERPL-SCNC: 5.4 MMOL/L (ref 3.4–5.3)
PROT SERPL-MCNC: 7.4 G/DL (ref 6.4–8.3)
RBC # BLD AUTO: 4.89 10E6/UL (ref 4.4–5.9)
RBC MORPH BLD: ABNORMAL
SODIUM SERPL-SCNC: 139 MMOL/L (ref 135–145)
WBC # BLD AUTO: 105.9 10E3/UL (ref 4–11)

## 2023-11-15 PROCEDURE — 91320 SARSCV2 VAC 30MCG TRS-SUC IM: CPT | Performed by: INTERNAL MEDICINE

## 2023-11-15 PROCEDURE — G0008 ADMIN INFLUENZA VIRUS VAC: HCPCS | Performed by: INTERNAL MEDICINE

## 2023-11-15 PROCEDURE — 99214 OFFICE O/P EST MOD 30 MIN: CPT | Performed by: INTERNAL MEDICINE

## 2023-11-15 PROCEDURE — 90682 RIV4 VACC RECOMBINANT DNA IM: CPT | Performed by: INTERNAL MEDICINE

## 2023-11-15 PROCEDURE — G0463 HOSPITAL OUTPT CLINIC VISIT: HCPCS | Mod: 25 | Performed by: INTERNAL MEDICINE

## 2023-11-15 PROCEDURE — 80053 COMPREHEN METABOLIC PANEL: CPT | Performed by: INTERNAL MEDICINE

## 2023-11-15 PROCEDURE — 250N000011 HC RX IP 250 OP 636: Performed by: INTERNAL MEDICINE

## 2023-11-15 PROCEDURE — 90480 ADMN SARSCOV2 VAC 1/ONLY CMP: CPT | Performed by: INTERNAL MEDICINE

## 2023-11-15 PROCEDURE — 36415 COLL VENOUS BLD VENIPUNCTURE: CPT | Performed by: INTERNAL MEDICINE

## 2023-11-15 PROCEDURE — 83615 LACTATE (LD) (LDH) ENZYME: CPT | Performed by: INTERNAL MEDICINE

## 2023-11-15 PROCEDURE — 85027 COMPLETE CBC AUTOMATED: CPT | Performed by: INTERNAL MEDICINE

## 2023-11-15 PROCEDURE — 85007 BL SMEAR W/DIFF WBC COUNT: CPT | Performed by: INTERNAL MEDICINE

## 2023-11-15 RX ORDER — DIPHENHYDRAMINE HCL 25 MG
50 CAPSULE ORAL
Status: DISCONTINUED | OUTPATIENT
Start: 2023-11-15 | End: 2023-11-15 | Stop reason: HOSPADM

## 2023-11-15 RX ORDER — EPINEPHRINE 1 MG/ML
0.3 INJECTION, SOLUTION INTRAMUSCULAR; SUBCUTANEOUS EVERY 5 MIN PRN
Status: DISCONTINUED | OUTPATIENT
Start: 2023-11-15 | End: 2023-11-15 | Stop reason: HOSPADM

## 2023-11-15 RX ORDER — DIPHENHYDRAMINE HYDROCHLORIDE 50 MG/ML
50 INJECTION INTRAMUSCULAR; INTRAVENOUS
Status: DISCONTINUED | OUTPATIENT
Start: 2023-11-15 | End: 2023-11-15 | Stop reason: HOSPADM

## 2023-11-15 RX ADMIN — INFLUENZA A VIRUS A/WEST VIRGINIA/30/2022 (H1N1) RECOMBINANT HEMAGGLUTININ ANTIGEN, INFLUENZA A VIRUS A/DARWIN/6/2021 (H3N2) RECOMBINANT HEMAGGLUTININ ANTIGEN, INFLUENZA B VIRUS B/AUSTRIA/1359417/2021 RECOMBINANT HEMAGGLUTININ ANTIGEN, AND INFLUENZA B VIRUS B/PHUKET/3073/2013 RECOMBINANT HEMAGGLUTININ ANTIGEN 0.5 ML: 45; 45; 45; 45 INJECTION INTRAMUSCULAR at 10:41

## 2023-11-15 RX ADMIN — COVID-19 VACCINE, MRNA 30 MCG: 0.05 INJECTION, SUSPENSION INTRAMUSCULAR at 10:42

## 2023-11-15 ASSESSMENT — PAIN SCALES - GENERAL: PAINLEVEL: NO PAIN (0)

## 2023-11-15 NOTE — NURSING NOTE
Chief Complaint   Patient presents with    Blood Draw     Labs drawn via  by RN. Vitals taken.     Labs drawn with  by RN. Vitals taken. Patient checked into next appointment.    Clarisa Marti RN

## 2023-11-15 NOTE — PROGRESS NOTES
Clinic Nursing Note:  Familia Danielle presents today for Covid Vaccine and Flu Shot.    Patient seen by provider today: Yes: Dr. Carolina   present during visit today: Not Applicable.    Note: Flu and Covid Vaccine given per Dr. Carolina.    Post Infusion Assessment:  Patient tolerated Flu Shot to Right Arm without incident.  Patient tolerated Covid Vaccine to Left Arm without incident.  Site patent and intact, free from redness, edema or discomfort.  No evidence of extravasations.     Discharge Plan:   Patient discharged in stable condition accompanied by: wife.  Departure Mode: Ambulatory.      Ruth Thompson RN

## 2023-11-15 NOTE — LETTER
11/15/2023         RE: Familia Danielle  [address] [facility] [address]        Dear Colleague,    Thank you for referring your patient, Familia Danielle, to the [facility]. Please see a copy of my visit note below.    REASON FOR VISIT:  Management of chronic lymphocytic leukemia (CLL)    HISTORY OF PRESENT ILLNESS:  Familia Danielle is a 63 year old with chronic lymphocytic leukemia (CLL).  To summarize his course, he was incidentally noted to have leukocytosis/lymphocytosis with WBC 57 x 10^9/L with normal hemoglobin and plateles around 02/2021.  There were no recurrent CLL associated cytogenetic abnormalities by FISH.  No obvious lymphadenopathy or splenomegaly.  CT chest abdomen and pelvis in 03/2021 was unremarkable.  Clinically Mehta Stage was 0.  He had mild fatigue but no obvious CLL complications.  He was monitored without treatment.  Lymphocytosis has progressed up to 90 x 10^9/L range while hemoglobin and platelets remained normal.  He had been discussing CLL directed therapy with his previous CLL provider Dr. Deysi Campbell.  We saw him for another opinion and reviewed options and agreed to monitor with active surveillance.  Visit for management of CLL.    He is here with his SO Jazmine.  No new night sweats or unintentional weight loss.  No headache or focal weakness or sensory changes.  No dyspnea, cough, or chest pain.  Normal bowel function.  No bleeding or unusual bruising.  No new lumps or bumps.  Going to [place] for about a month in January.  No concerns today.    PAST MEDICAL HISTORY:  No major medical history    MEDICATIONS:  Current Outpatient Medications   Medication    vitamin C (ASCORBIC ACID) 1000 MG TABS    vitamin D3 (CHOLECALCIFEROL) 50 mcg (2000 units) tablet     Current Facility-Administered Medications   Medication    COVID-19 mRNA vaccine 12+y (COMIRNATY (PFIZER)) injection 30 mcg    COVID-19 mRNA vaccine 12+y (COMIRNATY (PFIZER)) injection 30 mcg    diphenhydrAMINE  (BENADRYL) capsule 50 mg    Or    diphenhydrAMINE (BENADRYL) injection 50 mg    EPINEPHrine (Anaphylaxis) (ADRENALIN) injection (vial) 0.3 mg    influenza recomb quadrivalent PF (FLUBLOK) injection 0.5 mL     SOCIAL HISTORY:  Previous tobacco use (chew), quit 2016. 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    PHYSICAL EXAMINATION:  /81 (BP Location: Right arm, Patient Position: Sitting, Cuff Size: Adult Regular)   Pulse 79   Temp 97.5  F (36.4  C) (Oral)   Resp 18   Wt 103.7 kg (228 lb 9.6 oz)   SpO2 98%   BMI 29.56 kg/m    Wt Readings from Last 5 Encounters:   11/15/23 103.7 kg (228 lb 9.6 oz)   07/19/23 103 kg (227 lb)   03/08/23 108.6 kg (239 lb 8 oz)   11/02/22 108 kg (238 lb)     General: Well appearing. No distress.  HEENT: Sclerae anicteric.  Lungs: Clear bilaterally without wheezing or crackles.  Heart: Regular rate and rhythm.  Gastrointestinal: Bowel sounds present, no tenderness to palpation, spleen tip not palpable.  Extremities: No lower extremity edema.  Lymph:  No cervical, clavicular, axillary, epitrochlear, or inguinal lymphadenopathy.  Skin: ~1cm firm area on waistline where he removed a tick with no erythema, some resolving bruise  Performance status: ECOG 0    LABORATORY DATA: Reviewed by me  Recent Labs   Lab Test 11/15/23  0942 07/19/23  0935 03/08/23  0958 11/02/22  1231   .9* 100.1* 95.2* 90.3*   HGB 15.7 14.5 14.5 14.7   * 153 148* 159   ANEU  --  2.0 5.7 5.4   ALYM  --  97.1* 85.7* 83.1*   RETICABSCT  --   --   --  0.073     Recent Labs   Lab Test 11/15/23  0942 07/19/23  0935 03/08/23  0958    138 139   POTASSIUM 5.4* 5.5* 5.2   CHLORIDE 101 104 102   CO2 27 25 28   BUN 21.6 15.1 17.9   CR 1.30* 1.14 1.19*   PANFILO 9.7 9.0 9.5    193 229     Recent Labs   Lab Test 11/15/23  0942 07/19/23  0935 03/08/23  0958   AST 24 28 32   ALT 13 13 18   ALKPHOS 92 76 74   BILITOTAL 0.8 1.2 1.0     IMPRESSION AND PLAN:  Familia Danielle is a 63 year  old with chronic lymphocytic leukemia (CLL)    While the ALC has trended up in the past couple of years, hemoglobin and platelets remains normal, and there is no bothersome/threatening lymphadenopathy, symptoms/complications, or any other urgent indication for CLL treatment. We will continue active surveillance, and he will let us know if anything comes up between visits.    He has no recurrent infections or active ID issues.  He has received Prevnar 20.  He got the 1st dose of the Shingrix series 10/30/2023.  We reviewed the current COVID-19 vaccine guidelines.  He got a flu shot and updated COVID-19 vaccine today.  I recommended an RSV vaccine.      His mild hyperkalemia is very likely pseudohyperkalemia related to leukocytosis/lymphocytosis - no symptoms, renal function good, not on any meds.  He will continue to work with his primary care provider Dr. Leonard Marks for health maintenance and other medical issues.    We will arrange labs and another visit in about 4 months.  I reminded them to contact us if questions, concerns, or new issues come up between visits.    Total of 30 minutes on patient visit, reviewing records, interpreting test results, placing orders, and documentation on the day of service.    Theodore Carolina MD, PhD  Attending Physician, [facility]  [phone] clinic        Clinic Nursing Note:  Familia Danielle presents today for Covid Vaccine and Flu Shot.    Patient seen by provider today: Yes: Dr. Carolina   present during visit today: Not Applicable.    Note: Flu and Covid Vaccine given per Dr. Carolina.    Post Infusion Assessment:  Patient tolerated Flu Shot to Right Arm without incident.  Patient tolerated Covid Vaccine to Left Arm without incident.  Site patent and intact, free from redness, edema or discomfort.  No evidence of extravasations.     Discharge Plan:   Patient discharged in stable condition accompanied by: wife.  Departure Mode:  Ambulatory.      Ruth Thompson RN

## 2023-11-15 NOTE — NURSING NOTE
"Oncology Rooming Note    November 15, 2023 9:54 AM   Familia Danielle is a 63 year old male who presents for:    Chief Complaint   Patient presents with    Blood Draw     Labs drawn via  by RN. Vitals taken.    Oncology Clinic Visit     RTN for CLL     Initial Vitals: /81 (BP Location: Right arm, Patient Position: Sitting, Cuff Size: Adult Regular)   Pulse 79   Temp 97.5  F (36.4  C) (Oral)   Resp 18   Wt 103.7 kg (228 lb 9.6 oz)   SpO2 98%   BMI 29.56 kg/m   Estimated body mass index is 29.56 kg/m  as calculated from the following:    Height as of 11/2/22: 1.873 m (6' 1.74\").    Weight as of this encounter: 103.7 kg (228 lb 9.6 oz). Body surface area is 2.32 meters squared.  No Pain (0) Comment: Data Unavailable   No LMP for male patient.  Allergies reviewed: Yes  Medications reviewed: Yes    Medications: Medication refills not needed today.  Pharmacy name entered into EPIC: Data Unavailable    Clinical concerns: none       Veronica Almanzar MA             "

## 2024-01-06 ENCOUNTER — HEALTH MAINTENANCE LETTER (OUTPATIENT)
Age: 64
End: 2024-01-06

## 2024-02-14 ENCOUNTER — PATIENT OUTREACH (OUTPATIENT)
Dept: ONCOLOGY | Facility: CLINIC | Age: 64
End: 2024-02-14
Payer: COMMERCIAL

## 2024-02-14 NOTE — PROGRESS NOTES
[facility]: Cancer Care                                                                                          Completed chart audit to update Oncology Care Coordination enrollment status.  Reviewed POC and pt has appropriate follow up scheduled.       KENDRICK HillsN, RN  RN Care Coordinator  [facility]

## 2024-03-13 ENCOUNTER — ONCOLOGY VISIT (OUTPATIENT)
Dept: ONCOLOGY | Facility: CLINIC | Age: 64
End: 2024-03-13
Attending: INTERNAL MEDICINE
Payer: COMMERCIAL

## 2024-03-13 ENCOUNTER — APPOINTMENT (OUTPATIENT)
Dept: LAB | Facility: CLINIC | Age: 64
End: 2024-03-13
Attending: INTERNAL MEDICINE
Payer: COMMERCIAL

## 2024-03-13 VITALS
TEMPERATURE: 97.4 F | RESPIRATION RATE: 16 BRPM | HEART RATE: 75 BPM | DIASTOLIC BLOOD PRESSURE: 92 MMHG | SYSTOLIC BLOOD PRESSURE: 157 MMHG | WEIGHT: 241.9 LBS | BODY MASS INDEX: 31.28 KG/M2 | OXYGEN SATURATION: 99 %

## 2024-03-13 DIAGNOSIS — C91.10 CLL (CHRONIC LYMPHOCYTIC LEUKEMIA) (H): Primary | ICD-10-CM

## 2024-03-13 LAB
ALBUMIN SERPL BCG-MCNC: 4.6 G/DL (ref 3.5–5.2)
ALP SERPL-CCNC: 88 U/L (ref 40–150)
ALT SERPL W P-5'-P-CCNC: 12 U/L (ref 0–70)
ANION GAP SERPL CALCULATED.3IONS-SCNC: 10 MMOL/L (ref 7–15)
AST SERPL W P-5'-P-CCNC: 29 U/L (ref 0–45)
BASOPHILS # BLD AUTO: ABNORMAL 10*3/UL
BASOPHILS # BLD MANUAL: 0 10E3/UL (ref 0–0.2)
BASOPHILS NFR BLD AUTO: ABNORMAL %
BASOPHILS NFR BLD MANUAL: 0 %
BILIRUB SERPL-MCNC: 1.2 MG/DL
BUN SERPL-MCNC: 17.4 MG/DL (ref 8–23)
CALCIUM SERPL-MCNC: 9.1 MG/DL (ref 8.8–10.2)
CHLORIDE SERPL-SCNC: 102 MMOL/L (ref 98–107)
CREAT SERPL-MCNC: 1.29 MG/DL (ref 0.67–1.17)
DEPRECATED HCO3 PLAS-SCNC: 27 MMOL/L (ref 22–29)
EGFRCR SERPLBLD CKD-EPI 2021: 62 ML/MIN/1.73M2
EOSINOPHIL # BLD AUTO: ABNORMAL 10*3/UL
EOSINOPHIL # BLD MANUAL: 0 10E3/UL (ref 0–0.7)
EOSINOPHIL NFR BLD AUTO: ABNORMAL %
EOSINOPHIL NFR BLD MANUAL: 0 %
ERYTHROCYTE [DISTWIDTH] IN BLOOD BY AUTOMATED COUNT: 12.8 % (ref 10–15)
GLUCOSE SERPL-MCNC: 104 MG/DL (ref 70–99)
HCT VFR BLD AUTO: 48.1 % (ref 40–53)
HGB BLD-MCNC: 15.3 G/DL (ref 13.3–17.7)
IMM GRANULOCYTES # BLD: ABNORMAL 10*3/UL
IMM GRANULOCYTES NFR BLD: ABNORMAL %
LDH SERPL L TO P-CCNC: 205 U/L (ref 0–250)
LYMPHOCYTES # BLD AUTO: ABNORMAL 10*3/UL
LYMPHOCYTES # BLD MANUAL: 128.3 10E3/UL (ref 0.8–5.3)
LYMPHOCYTES NFR BLD AUTO: ABNORMAL %
LYMPHOCYTES NFR BLD MANUAL: 95 %
MCH RBC QN AUTO: 31.4 PG (ref 26.5–33)
MCHC RBC AUTO-ENTMCNC: 31.8 G/DL (ref 31.5–36.5)
MCV RBC AUTO: 99 FL (ref 78–100)
MONOCYTES # BLD AUTO: ABNORMAL 10*3/UL
MONOCYTES # BLD MANUAL: 1.4 10E3/UL (ref 0–1.3)
MONOCYTES NFR BLD AUTO: ABNORMAL %
MONOCYTES NFR BLD MANUAL: 1 %
NEUTROPHILS # BLD AUTO: ABNORMAL 10*3/UL
NEUTROPHILS # BLD MANUAL: 5.4 10E3/UL (ref 1.6–8.3)
NEUTROPHILS NFR BLD AUTO: ABNORMAL %
NEUTROPHILS NFR BLD MANUAL: 4 %
NRBC # BLD AUTO: 0 10E3/UL
NRBC BLD AUTO-RTO: 0 /100
PLAT MORPH BLD: ABNORMAL
PLATELET # BLD AUTO: 150 10E3/UL (ref 150–450)
POTASSIUM SERPL-SCNC: 5.7 MMOL/L (ref 3.4–5.3)
PROT SERPL-MCNC: 7.1 G/DL (ref 6.4–8.3)
RBC # BLD AUTO: 4.87 10E6/UL (ref 4.4–5.9)
RBC MORPH BLD: ABNORMAL
SMUDGE CELLS BLD QL SMEAR: PRESENT
SODIUM SERPL-SCNC: 139 MMOL/L (ref 135–145)
WBC # BLD AUTO: 135.1 10E3/UL (ref 4–11)

## 2024-03-13 PROCEDURE — 36415 COLL VENOUS BLD VENIPUNCTURE: CPT | Performed by: INTERNAL MEDICINE

## 2024-03-13 PROCEDURE — 85027 COMPLETE CBC AUTOMATED: CPT | Performed by: INTERNAL MEDICINE

## 2024-03-13 PROCEDURE — 85007 BL SMEAR W/DIFF WBC COUNT: CPT | Performed by: INTERNAL MEDICINE

## 2024-03-13 PROCEDURE — 83615 LACTATE (LD) (LDH) ENZYME: CPT | Performed by: INTERNAL MEDICINE

## 2024-03-13 PROCEDURE — 80053 COMPREHEN METABOLIC PANEL: CPT | Performed by: INTERNAL MEDICINE

## 2024-03-13 PROCEDURE — 99214 OFFICE O/P EST MOD 30 MIN: CPT | Performed by: INTERNAL MEDICINE

## 2024-03-13 PROCEDURE — G0463 HOSPITAL OUTPT CLINIC VISIT: HCPCS | Performed by: INTERNAL MEDICINE

## 2024-03-13 ASSESSMENT — PAIN SCALES - GENERAL: PAINLEVEL: NO PAIN (0)

## 2024-03-13 NOTE — NURSING NOTE
Chief Complaint   Patient presents with    Blood Draw     Vitals taken, venipuncture labs drawn, checked into next appt     BP (!) 157/92 (BP Location: Left arm, Patient Position: Sitting, Cuff Size: Adult Large)   Pulse 75   Temp 97.4  F (36.3  C)   Resp 16   Wt 109.7 kg (241 lb 14.4 oz)   SpO2 99%   BMI 31.28 kg/m    Luis Carlos López RN on 3/13/2024 at 9:33 AM

## 2024-03-13 NOTE — PROGRESS NOTES
REASON FOR VISIT:  Management of chronic lymphocytic leukemia (CLL)    HISTORY OF PRESENT ILLNESS:  Familia Danielle is a 64 year old with chronic lymphocytic leukemia (CLL).  To summarize his course, he was incidentally noted to have leukocytosis/lymphocytosis with WBC 57 x 10^9/L with normal hemoglobin and platelets around 02/2021.  There were no recurrent CLL associated cytogenetic abnormalities by FISH.  No obvious lymphadenopathy or splenomegaly.  CT chest abdomen and pelvis in 03/2021 was unremarkable.  Clinically Mehta Stage was 0.  He had mild fatigue but no obvious CLL complications.  He was monitored without treatment.  Lymphocytosis has progressed up to 90 x 10^9/L range while hemoglobin and platelets remained normal.  He had been discussing CLL directed therapy with his previous CLL provider Dr. Deysi Campbell.  We saw him for another opinion and reviewed options and agreed to monitor with active surveillance.  Visit for management of CLL.    He is here with his SO Jazmine.  No new night sweats or unintentional weight loss.  No headache or focal weakness or sensory changes.  No dyspnea, cough, or chest pain.  Normal bowel function.  No bleeding or unusual bruising.  No new lumps or bumps.  Was in [place] earlier in the year, had a good time. Went for plenty of long walks on the beach, up to 5 miles on most days. He has noted small mole on his right anterior chest that he plans to address with Dermatology soon. No concerns today.    PAST MEDICAL HISTORY:  No major medical history    MEDICATIONS:  Current Outpatient Medications   Medication    vitamin C (ASCORBIC ACID) 1000 MG TABS    vitamin D3 (CHOLECALCIFEROL) 50 mcg (2000 units) tablet     No current facility-administered medications for this visit.     SOCIAL HISTORY:  Previous tobacco use (chew), quit 2016. 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    PHYSICAL EXAMINATION:  BP (!) 157/92 (BP Location: Left arm, Patient Position: Sitting,  Cuff Size: Adult Large)   Pulse 75   Temp 97.4  F (36.3  C)   Resp 16   Wt 109.7 kg (241 lb 14.4 oz)   SpO2 99%   BMI 31.28 kg/m    Wt Readings from Last 5 Encounters:   03/13/24 109.7 kg (241 lb 14.4 oz)   11/15/23 103.7 kg (228 lb 9.6 oz)   07/19/23 103 kg (227 lb)   03/08/23 108.6 kg (239 lb 8 oz)   11/02/22 108 kg (238 lb)     General: Well appearing. No distress.  HEENT: Sclerae anicteric.  Lungs: Clear bilaterally without wheezing or crackles.  Heart: Regular rate and rhythm.  Gastrointestinal: Bowel sounds present, no tenderness to palpation, spleen tip not palpable.  Extremities: No lower extremity edema.  Lymph:  No cervical, clavicular, axillary, epitrochlear, or inguinal lymphadenopathy.  Skin: no skin changes, no new skin rashes.   Performance status: ECOG 0    LABORATORY DATA: Reviewed by me  Recent Labs   Lab Test 03/13/24  0934 11/15/23  0942 07/19/23  0935 03/08/23  0958 11/02/22  1231   .1* 105.9* 100.1*   < > 90.3*   HGB 15.3 15.7 14.5   < > 14.7    146* 153   < > 159   ANEU 5.4 5.3 2.0   < > 5.4   ALYM 128.3* 98.5* 97.1*   < > 83.1*   RETICABSCT  --   --   --   --  0.073    < > = values in this interval not displayed.     Recent Labs   Lab Test 03/13/24  0934 11/15/23  0942 07/19/23  0935    139 138   POTASSIUM 5.7* 5.4* 5.5*   CHLORIDE 102 101 104   CO2 27 27 25   BUN 17.4 21.6 15.1   CR 1.29* 1.30* 1.14   PANFILO 9.1 9.7 9.0    178 193     Recent Labs   Lab Test 03/13/24  0934 11/15/23  0942 07/19/23  0935   AST 29 24 28   ALT 12 13 13   ALKPHOS 88 92 76   BILITOTAL 1.2 0.8 1.2     IMPRESSION AND PLAN:  Familia Danielle is a 64 year old with chronic lymphocytic leukemia (CLL).     While the ALC has trended up in the past couple of years, hemoglobin and platelets remains normal, and there is no bothersome/threatening lymphadenopathy, symptoms/complications, or any other urgent indication for CLL treatment. We will continue active surveillance, and he will let us know if  anything comes up between visits.    He has no recurrent infections or active ID issues.  He is up to date for Prevnar 20 and the Shingrix series.  He received a flu shot and updated COVID-19 vaccine in November 2023.  I recommended an RSV vaccine.      He will continue to work with his primary care provider Dr. Leonard Marks for health maintenance and other medical issues.    We will arrange labs and another visit in about 4 months.  I reminded them to contact us if questions, concerns, or new issues come up between visits.    The patient was seen with and the above assessment and plan was discussed with staff physician Dr. Theodore Carolina MD PhD who agreed with the same.    Fernie Rosa   PGY-6 Fellow, Hematology,Oncology and BMT  [facility]      ATTENDING ATTESTATION:  The patient was seen and evaluated by me.  I have reviewed the vital signs, medications, labs, and imaging results independently, and have discussed the patient and plan with the resident/fellow, and agree with the findings and plan outlined in this note.  The impression and plan were jointly made.    HISTORY OF PRESENT ILLNESS:  Familia Danielle is a 64 year old with chronic lymphocytic leukemia (CLL).  To summarize his course, he was incidentally noted to have leukocytosis/lymphocytosis with WBC 57 x 10^9/L with normal hemoglobin and plateles around 02/2021.  There were no recurrent CLL associated cytogenetic abnormalities by FISH.  No obvious lymphadenopathy or splenomegaly.  CT chest abdomen and pelvis in 03/2021 was unremarkable.  Clinically Mehta Stage was 0.  He had mild fatigue but no obvious CLL complications.  He was monitored without treatment.  Lymphocytosis has progressed up to 90 x 10^9/L range while hemoglobin and platelets remained normal.  He had been discussing CLL directed therapy with his previous CLL provider Dr. Deysi Campbell.  We saw him for another opinion and reviewed options and agreed to monitor with active  surveillance.  Visit for management of CLL.     He is here with his REY Lucero.      IMPRESSION AND PLAN:  Familia Danielle is a 64 year old with chronic lymphocytic leukemia (CLL).     While the ALC has trended up in the past couple of years, hemoglobin and platelets remains normal, and there is no bothersome/threatening lymphadenopathy, symptoms/complications, or any other urgent indication for CLL treatment. We will continue active surveillance, and he will let us know if anything comes up between visits.     There are no recurrent or active ID issues.  He has received Prevnar 20 and the Shingrix series.  He is up to date for a flu shot and updated COVID-19 vaccine.  I recommended an RSV vaccine.       His mild hyperkalemia is very likely pseudohyperkalemia related to leukocytosis/lymphocytosis - no symptoms, renal function good, not on any meds.  He will continue to work with his primary care provider Dr. Leonard Marks for health maintenance and other medical issues.     We will arrange labs and another visit in about 4 months.  I reminded them to contact us if questions, concerns, or new issues come up between visits.     Theodore Carolina MD, PhD  Attending Physician, [facility]  [phone] clinic

## 2024-03-13 NOTE — LETTER
3/13/2024         RE: Familia Danielle  [address] [facility] [address]        Dear Colleague,    Thank you for referring your patient, Familia Danielle, to the [facility]. Please see a copy of my visit note below.    REASON FOR VISIT:  Management of chronic lymphocytic leukemia (CLL)    HISTORY OF PRESENT ILLNESS:  Familia Danielle is a 64 year old with chronic lymphocytic leukemia (CLL).  To summarize his course, he was incidentally noted to have leukocytosis/lymphocytosis with WBC 57 x 10^9/L with normal hemoglobin and platelets around 02/2021.  There were no recurrent CLL associated cytogenetic abnormalities by FISH.  No obvious lymphadenopathy or splenomegaly.  CT chest abdomen and pelvis in 03/2021 was unremarkable.  Clinically Mehta Stage was 0.  He had mild fatigue but no obvious CLL complications.  He was monitored without treatment.  Lymphocytosis has progressed up to 90 x 10^9/L range while hemoglobin and platelets remained normal.  He had been discussing CLL directed therapy with his previous CLL provider Dr. Deysi Campbell.  We saw him for another opinion and reviewed options and agreed to monitor with active surveillance.  Visit for management of CLL.    He is here with his SO Jazmine.  No new night sweats or unintentional weight loss.  No headache or focal weakness or sensory changes.  No dyspnea, cough, or chest pain.  Normal bowel function.  No bleeding or unusual bruising.  No new lumps or bumps.  Was in [place] earlier in the year, had a good time. Went for plenty of long walks on the beach, up to 5 miles on most days. He has noted small mole on his right anterior chest that he plans to address with Dermatology soon. No concerns today.    PAST MEDICAL HISTORY:  No major medical history    MEDICATIONS:  Current Outpatient Medications   Medication    vitamin C (ASCORBIC ACID) 1000 MG TABS    vitamin D3 (CHOLECALCIFEROL) 50 mcg (2000 units) tablet     No current  facility-administered medications for this visit.     SOCIAL HISTORY:  Previous tobacco use (chew), quit 2016. 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    PHYSICAL EXAMINATION:  BP (!) 157/92 (BP Location: Left arm, Patient Position: Sitting, Cuff Size: Adult Large)   Pulse 75   Temp 97.4  F (36.3  C)   Resp 16   Wt 109.7 kg (241 lb 14.4 oz)   SpO2 99%   BMI 31.28 kg/m    Wt Readings from Last 5 Encounters:   03/13/24 109.7 kg (241 lb 14.4 oz)   11/15/23 103.7 kg (228 lb 9.6 oz)   07/19/23 103 kg (227 lb)   03/08/23 108.6 kg (239 lb 8 oz)   11/02/22 108 kg (238 lb)     General: Well appearing. No distress.  HEENT: Sclerae anicteric.  Lungs: Clear bilaterally without wheezing or crackles.  Heart: Regular rate and rhythm.  Gastrointestinal: Bowel sounds present, no tenderness to palpation, spleen tip not palpable.  Extremities: No lower extremity edema.  Lymph:  No cervical, clavicular, axillary, epitrochlear, or inguinal lymphadenopathy.  Skin: no skin changes, no new skin rashes.   Performance status: ECOG 0    LABORATORY DATA: Reviewed by me  Recent Labs   Lab Test 03/13/24  0934 11/15/23  0942 07/19/23  0935 03/08/23  0958 11/02/22  1231   .1* 105.9* 100.1*   < > 90.3*   HGB 15.3 15.7 14.5   < > 14.7    146* 153   < > 159   ANEU 5.4 5.3 2.0   < > 5.4   ALYM 128.3* 98.5* 97.1*   < > 83.1*   RETICABSCT  --   --   --   --  0.073    < > = values in this interval not displayed.     Recent Labs   Lab Test 03/13/24  0934 11/15/23  0942 07/19/23  0935    139 138   POTASSIUM 5.7* 5.4* 5.5*   CHLORIDE 102 101 104   CO2 27 27 25   BUN 17.4 21.6 15.1   CR 1.29* 1.30* 1.14   PANFILO 9.1 9.7 9.0    178 193     Recent Labs   Lab Test 03/13/24  0934 11/15/23  0942 07/19/23  0935   AST 29 24 28   ALT 12 13 13   ALKPHOS 88 92 76   BILITOTAL 1.2 0.8 1.2     IMPRESSION AND PLAN:  Familia Danielle is a 64 year old with chronic lymphocytic leukemia (CLL).     While the ALC has trended  up in the past couple of years, hemoglobin and platelets remains normal, and there is no bothersome/threatening lymphadenopathy, symptoms/complications, or any other urgent indication for CLL treatment. We will continue active surveillance, and he will let us know if anything comes up between visits.    He has no recurrent infections or active ID issues.  He is up to date for Prevnar 20 and the Shingrix series.  He received a flu shot and updated COVID-19 vaccine in November 2023.  I recommended an RSV vaccine.      He will continue to work with his primary care provider Dr. Leonard Marks for health maintenance and other medical issues.    We will arrange labs and another visit in about 4 months.  I reminded them to contact us if questions, concerns, or new issues come up between visits.    The patient was seen with and the above assessment and plan was discussed with staff physician Dr. Theodore Carolina MD PhD who agreed with the same.    Fernie Rosa   PGY-6 Fellow, Hematology,Oncology and BMT  [facility]      ATTENDING ATTESTATION:  The patient was seen and evaluated by me.  I have reviewed the vital signs, medications, labs, and imaging results independently, and have discussed the patient and plan with the resident/fellow, and agree with the findings and plan outlined in this note.  The impression and plan were jointly made.    HISTORY OF PRESENT ILLNESS:  Familia Danielle is a 64 year old with chronic lymphocytic leukemia (CLL).  To summarize his course, he was incidentally noted to have leukocytosis/lymphocytosis with WBC 57 x 10^9/L with normal hemoglobin and plateles around 02/2021.  There were no recurrent CLL associated cytogenetic abnormalities by FISH.  No obvious lymphadenopathy or splenomegaly.  CT chest abdomen and pelvis in 03/2021 was unremarkable.  Clinically Mehta Stage was 0.  He had mild fatigue but no obvious CLL complications.  He was monitored without treatment.  Lymphocytosis has  progressed up to 90 x 10^9/L range while hemoglobin and platelets remained normal.  He had been discussing CLL directed therapy with his previous CLL provider Dr. Deysi Campbell.  We saw him for another opinion and reviewed options and agreed to monitor with active surveillance.  Visit for management of CLL.     He is here with his REY Lucero.      IMPRESSION AND PLAN:  Familia Danielle is a 64 year old with chronic lymphocytic leukemia (CLL).     While the ALC has trended up in the past couple of years, hemoglobin and platelets remains normal, and there is no bothersome/threatening lymphadenopathy, symptoms/complications, or any other urgent indication for CLL treatment. We will continue active surveillance, and he will let us know if anything comes up between visits.     There are no recurrent or active ID issues.  He has received Prevnar 20 and the Shingrix series.  He is up to date for a flu shot and updated COVID-19 vaccine.  I recommended an RSV vaccine.       His mild hyperkalemia is very likely pseudohyperkalemia related to leukocytosis/lymphocytosis - no symptoms, renal function good, not on any meds.  He will continue to work with his primary care provider Dr. Leonard Marks for health maintenance and other medical issues.     We will arrange labs and another visit in about 4 months.  I reminded them to contact us if questions, concerns, or new issues come up between visits.     Theodore Carolina MD, PhD  Attending Physician, [facility]  [phone] clinic

## 2024-03-13 NOTE — NURSING NOTE
"Oncology Rooming Note    March 13, 2024 9:51 AM   Familia Danielle is a 64 year old male who presents for:    Chief Complaint   Patient presents with    Blood Draw     Vitals taken, venipuncture labs drawn, checked into next appt    Oncology Clinic Visit     Chronic Lymphocytic Leukemia     Initial Vitals: BP (!) 157/92 (BP Location: Left arm, Patient Position: Sitting, Cuff Size: Adult Large)   Pulse 75   Temp 97.4  F (36.3  C)   Resp 16   Wt 109.7 kg (241 lb 14.4 oz)   SpO2 99%   BMI 31.28 kg/m   Estimated body mass index is 31.28 kg/m  as calculated from the following:    Height as of 11/2/22: 1.873 m (6' 1.74\").    Weight as of this encounter: 109.7 kg (241 lb 14.4 oz). Body surface area is 2.39 meters squared.  No Pain (0) Comment: Data Unavailable   No LMP for male patient.  Allergies reviewed: Yes  Medications reviewed: Yes    Medications: Medication refills not needed today.  Pharmacy name entered into [facility]: [id number] - [facility] [address]    Frailty Screening:   Is the patient here for a new oncology consult visit in cancer care? 2. No      Clinical concerns: None       Jesica Pandya LPN  3/13/2024              "

## 2024-03-14 ENCOUNTER — PATIENT OUTREACH (OUTPATIENT)
Dept: ONCOLOGY | Facility: CLINIC | Age: 64
End: 2024-03-14
Payer: COMMERCIAL

## 2024-03-14 NOTE — PROGRESS NOTES
[facility]: Cancer Care                                                                                          Chart reviewed following clinic visit yesterday.  My chart message sent to pt to check in and to remind pt to reach out if needs/concerns arise in between visits.  Contact info for clinic and writer provided.  Reviewed POC and pt has appropriate follow up scheduled.     TERESA Hills, RN  RN Care Coordinator  [facility]

## 2024-05-25 ENCOUNTER — HEALTH MAINTENANCE LETTER (OUTPATIENT)
Age: 64
End: 2024-05-25

## 2024-07-15 NOTE — PROGRESS NOTES
REASON FOR VISIT:  Management of chronic lymphocytic leukemia (CLL)    HISTORY OF PRESENT ILLNESS:  Familia Danielle is a 64 year old with chronic lymphocytic leukemia (CLL).  To summarize his course, he was incidentally noted to have leukocytosis/lymphocytosis with WBC 57 x 10^9/L with normal hemoglobin and platelets around 02/2021.  There were no recurrent CLL associated cytogenetic abnormalities by FISH.  No obvious lymphadenopathy or splenomegaly.  CT chest abdomen and pelvis in 03/2021 was unremarkable.  Clinically Mehta Stage was 0.  He had mild fatigue but no obvious CLL complications.  He was monitored without treatment.  Lymphocytosis has progressed up to 90 x 10^9/L range while hemoglobin and platelets remained normal.  He had been discussing CLL directed therapy with his previous CLL provider Dr. Deysi Campbell.  We saw him for another opinion and reviewed options and agreed to monitor with active surveillance.  Visit for management of CLL.    He is here with his SO Jazmine.  No new night sweats or unintentional weight loss.  No headache or focal weakness or sensory changes.  No dyspnea, cough, or chest pain.  Normal bowel function.  No bleeding or unusual bruising.  No new lumps or bumps.  Spending lot of time at the cabin near [place].  Getting  in October.  No CLL concerns today.     PAST MEDICAL HISTORY:  No major medical history    MEDICATIONS:  Current Outpatient Medications   Medication Sig Dispense Refill    vitamin C (ASCORBIC ACID) 1000 MG TABS Take 1,000 mg by mouth daily      vitamin D3 (CHOLECALCIFEROL) 50 mcg (2000 units) tablet Take 1 tablet by mouth daily       No current facility-administered medications for this visit.     SOCIAL HISTORY:  Previous tobacco use (chew), quit 2016. 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    PHYSICAL EXAMINATION:  /75 (BP Location: Right arm, Patient Position: Sitting, Cuff Size: Adult Large)   Pulse 62   Temp 97.8  F (36.6  C)  (Oral)   Resp 16   Wt 109.3 kg (240 lb 14.4 oz)   SpO2 97%   BMI 31.15 kg/m    Wt Readings from Last 5 Encounters:   07/17/24 109.3 kg (240 lb 14.4 oz)   03/13/24 109.7 kg (241 lb 14.4 oz)   11/15/23 103.7 kg (228 lb 9.6 oz)   07/19/23 103 kg (227 lb)   03/08/23 108.6 kg (239 lb 8 oz)     General: Well appearing. No distress.  HEENT: Sclerae anicteric.  Lungs: Clear bilaterally without wheezing or crackles.  Heart: Regular rate and rhythm.  Gastrointestinal: Bowel sounds present, no tenderness to palpation, spleen tip not palpable.  Extremities: No lower extremity edema.  Lymph:  No cervical, clavicular, axillary, epitrochlear, or inguinal lymphadenopathy.  Skin: no skin changes, no new skin rashes.   Performance status: ECOG 0    LABORATORY DATA: Reviewed by me  Recent Labs   Lab Test 07/17/24  1340 03/13/24  0934 11/15/23  0942 07/19/23  0935 03/08/23  0958 11/02/22  1231   .2* 135.1* 105.9* 100.1*   < > 90.3*   HGB 13.8 15.3 15.7 14.5   < > 14.7    150 146* 153   < > 159   ANEU  --  5.4 5.3 2.0   < > 5.4   ALYM  --  128.3* 98.5* 97.1*   < > 83.1*   RETICABSCT  --   --   --   --   --  0.073    < > = values in this interval not displayed.     Recent Labs   Lab Test 03/13/24  0934 11/15/23  0942 07/19/23  0935    139 138   POTASSIUM 5.7* 5.4* 5.5*   CHLORIDE 102 101 104   CO2 27 27 25   BUN 17.4 21.6 15.1   CR 1.29* 1.30* 1.14   PANFILO 9.1 9.7 9.0    178 193     Recent Labs   Lab Test 03/13/24  0934 11/15/23  0942 07/19/23  0935   AST 29 24 28   ALT 12 13 13   ALKPHOS 88 92 76   BILITOTAL 1.2 0.8 1.2     IMPRESSION AND PLAN:  Familia Danielle is a 64 year old with chronic lymphocytic leukemia (CLL).     While the ALC has trended up over the past couple of years, hemoglobin and platelets remains normal, there is no bothersome/threatening lymphadenopathy or symptoms/complications, and overall there is no urgent indication for CLL treatment. We agreed to continue active surveillance, and he will  let us know if anything comes up between visits.     There are no recurrent or active ID issues.  He has received Prevnar 20 and the Shingrix series.  I have recommended an RSV vaccine.  He is up to date for a flu shot and updated COVID-19 vaccine and should get these again in the fall.     His intermittent mild hyperkalemia is very likely pseudohyperkalemia related to leukocytosis/lymphocytosis - no symptoms, renal function good, not on any meds.  He will continue to work with his primary care provider Dr. Leonard Marks for health maintenance and other medical issues.     We will request labs and a visit in about 4 months.  I reminded them to contact us if questions, concerns, or new issues come up between visits.     Total of 30 minutes on patient visit, reviewing records, interpreting test results, placing orders, and documentation on the day of service.    The longitudinal plan of care for the diagnosis(es)/condition(s) as documented were addressed during this visit. Due to the added complexity in care, I will continue to support Familia in the subsequent management and with ongoing continuity of care.    Theodore Carolina MD, PhD  Attending Physician, [facility]  [phone] [facility]

## 2024-07-17 ENCOUNTER — APPOINTMENT (OUTPATIENT)
Dept: LAB | Facility: CLINIC | Age: 64
End: 2024-07-17
Attending: INTERNAL MEDICINE
Payer: COMMERCIAL

## 2024-07-17 ENCOUNTER — ONCOLOGY VISIT (OUTPATIENT)
Dept: ONCOLOGY | Facility: CLINIC | Age: 64
End: 2024-07-17
Attending: INTERNAL MEDICINE
Payer: COMMERCIAL

## 2024-07-17 VITALS
HEART RATE: 62 BPM | SYSTOLIC BLOOD PRESSURE: 121 MMHG | WEIGHT: 240.9 LBS | OXYGEN SATURATION: 97 % | DIASTOLIC BLOOD PRESSURE: 75 MMHG | BODY MASS INDEX: 31.15 KG/M2 | TEMPERATURE: 97.8 F | RESPIRATION RATE: 16 BRPM

## 2024-07-17 DIAGNOSIS — C91.10 CLL (CHRONIC LYMPHOCYTIC LEUKEMIA) (H): Primary | ICD-10-CM

## 2024-07-17 LAB
ALBUMIN SERPL BCG-MCNC: 4.4 G/DL (ref 3.5–5.2)
ALP SERPL-CCNC: 79 U/L (ref 40–150)
ALT SERPL W P-5'-P-CCNC: 13 U/L (ref 0–70)
ANION GAP SERPL CALCULATED.3IONS-SCNC: 8 MMOL/L (ref 7–15)
AST SERPL W P-5'-P-CCNC: 29 U/L (ref 0–45)
BASOPHILS # BLD AUTO: ABNORMAL 10*3/UL
BASOPHILS # BLD MANUAL: 0 10E3/UL (ref 0–0.2)
BASOPHILS NFR BLD AUTO: ABNORMAL %
BASOPHILS NFR BLD MANUAL: 0 %
BILIRUB SERPL-MCNC: 0.8 MG/DL
BUN SERPL-MCNC: 21.1 MG/DL (ref 8–23)
CALCIUM SERPL-MCNC: 9 MG/DL (ref 8.8–10.4)
CHLORIDE SERPL-SCNC: 105 MMOL/L (ref 98–107)
CREAT SERPL-MCNC: 1.18 MG/DL (ref 0.67–1.17)
EGFRCR SERPLBLD CKD-EPI 2021: 68 ML/MIN/1.73M2
EOSINOPHIL # BLD AUTO: ABNORMAL 10*3/UL
EOSINOPHIL # BLD MANUAL: 0 10E3/UL (ref 0–0.7)
EOSINOPHIL NFR BLD AUTO: ABNORMAL %
EOSINOPHIL NFR BLD MANUAL: 0 %
ERYTHROCYTE [DISTWIDTH] IN BLOOD BY AUTOMATED COUNT: 13.4 % (ref 10–15)
GLUCOSE SERPL-MCNC: 102 MG/DL (ref 70–99)
HCO3 SERPL-SCNC: 26 MMOL/L (ref 22–29)
HCT VFR BLD AUTO: 43 % (ref 40–53)
HGB BLD-MCNC: 13.8 G/DL (ref 13.3–17.7)
IMM GRANULOCYTES # BLD: ABNORMAL 10*3/UL
IMM GRANULOCYTES NFR BLD: ABNORMAL %
LDH SERPL L TO P-CCNC: 229 U/L (ref 0–250)
LYMPHOCYTES # BLD AUTO: ABNORMAL 10*3/UL
LYMPHOCYTES # BLD MANUAL: 104.8 10E3/UL (ref 0.8–5.3)
LYMPHOCYTES NFR BLD AUTO: ABNORMAL %
LYMPHOCYTES NFR BLD MANUAL: 91 %
MCH RBC QN AUTO: 31.8 PG (ref 26.5–33)
MCHC RBC AUTO-ENTMCNC: 32.1 G/DL (ref 31.5–36.5)
MCV RBC AUTO: 99 FL (ref 78–100)
MONOCYTES # BLD AUTO: ABNORMAL 10*3/UL
MONOCYTES # BLD MANUAL: 3.5 10E3/UL (ref 0–1.3)
MONOCYTES NFR BLD AUTO: ABNORMAL %
MONOCYTES NFR BLD MANUAL: 3 %
NEUTROPHILS # BLD AUTO: ABNORMAL 10*3/UL
NEUTROPHILS # BLD MANUAL: 6.9 10E3/UL (ref 1.6–8.3)
NEUTROPHILS NFR BLD AUTO: ABNORMAL %
NEUTROPHILS NFR BLD MANUAL: 6 %
NRBC # BLD AUTO: 0 10E3/UL
NRBC BLD AUTO-RTO: 0 /100
PLAT MORPH BLD: ABNORMAL
PLATELET # BLD AUTO: 162 10E3/UL (ref 150–450)
POTASSIUM SERPL-SCNC: 5.7 MMOL/L (ref 3.4–5.3)
PROT SERPL-MCNC: 6.7 G/DL (ref 6.4–8.3)
RBC # BLD AUTO: 4.34 10E6/UL (ref 4.4–5.9)
RBC MORPH BLD: ABNORMAL
SODIUM SERPL-SCNC: 139 MMOL/L (ref 135–145)
WBC # BLD AUTO: 115.2 10E3/UL (ref 4–11)

## 2024-07-17 PROCEDURE — 85027 COMPLETE CBC AUTOMATED: CPT | Performed by: INTERNAL MEDICINE

## 2024-07-17 PROCEDURE — G2211 COMPLEX E/M VISIT ADD ON: HCPCS | Performed by: INTERNAL MEDICINE

## 2024-07-17 PROCEDURE — G0463 HOSPITAL OUTPT CLINIC VISIT: HCPCS | Performed by: INTERNAL MEDICINE

## 2024-07-17 PROCEDURE — 36415 COLL VENOUS BLD VENIPUNCTURE: CPT | Performed by: INTERNAL MEDICINE

## 2024-07-17 PROCEDURE — 99214 OFFICE O/P EST MOD 30 MIN: CPT | Performed by: INTERNAL MEDICINE

## 2024-07-17 PROCEDURE — 83615 LACTATE (LD) (LDH) ENZYME: CPT | Performed by: INTERNAL MEDICINE

## 2024-07-17 PROCEDURE — 82040 ASSAY OF SERUM ALBUMIN: CPT | Performed by: INTERNAL MEDICINE

## 2024-07-17 PROCEDURE — 85007 BL SMEAR W/DIFF WBC COUNT: CPT | Performed by: INTERNAL MEDICINE

## 2024-07-17 RX ORDER — MULTIVITAMIN
TABLET ORAL
COMMUNITY
Start: 2024-06-01

## 2024-07-17 ASSESSMENT — PAIN SCALES - GENERAL: PAINLEVEL: NO PAIN (0)

## 2024-07-17 NOTE — NURSING NOTE
Chief Complaint   Patient presents with    Oncology Clinic Visit     CLL (chronic lymphocytic leukemia)     Blood Draw     Labs drawn via  by RN. VS taken.     Labs collected from venipuncture by RN. Vitals taken. Checked in for appointment(s).     Daisy Sofia RN

## 2024-07-17 NOTE — LETTER
7/17/2024      Familia Danielle  [address] [facility] [address]      Dear Colleague,    Thank you for referring your patient, Familia Danielle, to the [facility]. Please see a copy of my visit note below.    REASON FOR VISIT:  Management of chronic lymphocytic leukemia (CLL)    HISTORY OF PRESENT ILLNESS:  Familia Danielle is a 64 year old with chronic lymphocytic leukemia (CLL).  To summarize his course, he was incidentally noted to have leukocytosis/lymphocytosis with WBC 57 x 10^9/L with normal hemoglobin and platelets around 02/2021.  There were no recurrent CLL associated cytogenetic abnormalities by FISH.  No obvious lymphadenopathy or splenomegaly.  CT chest abdomen and pelvis in 03/2021 was unremarkable.  Clinically Mehta Stage was 0.  He had mild fatigue but no obvious CLL complications.  He was monitored without treatment.  Lymphocytosis has progressed up to 90 x 10^9/L range while hemoglobin and platelets remained normal.  He had been discussing CLL directed therapy with his previous CLL provider Dr. Deysi Campbell.  We saw him for another opinion and reviewed options and agreed to monitor with active surveillance.  Visit for management of CLL.    He is here with his REY Lucero.  No new night sweats or unintentional weight loss.  No headache or focal weakness or sensory changes.  No dyspnea, cough, or chest pain.  Normal bowel function.  No bleeding or unusual bruising.  No new lumps or bumps.  Spending lot of time at the cabin near [place].  Getting  in October.  No CLL concerns today.     PAST MEDICAL HISTORY:  No major medical history    MEDICATIONS:  Current Outpatient Medications   Medication Sig Dispense Refill     vitamin C (ASCORBIC ACID) 1000 MG TABS Take 1,000 mg by mouth daily       vitamin D3 (CHOLECALCIFEROL) 50 mcg (2000 units) tablet Take 1 tablet by mouth daily       No current facility-administered medications for this visit.     SOCIAL HISTORY:  Previous  tobacco use (chew), quit 2016. 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    PHYSICAL EXAMINATION:  /75 (BP Location: Right arm, Patient Position: Sitting, Cuff Size: Adult Large)   Pulse 62   Temp 97.8  F (36.6  C) (Oral)   Resp 16   Wt 109.3 kg (240 lb 14.4 oz)   SpO2 97%   BMI 31.15 kg/m    Wt Readings from Last 5 Encounters:   07/17/24 109.3 kg (240 lb 14.4 oz)   03/13/24 109.7 kg (241 lb 14.4 oz)   11/15/23 103.7 kg (228 lb 9.6 oz)   07/19/23 103 kg (227 lb)   03/08/23 108.6 kg (239 lb 8 oz)     General: Well appearing. No distress.  HEENT: Sclerae anicteric.  Lungs: Clear bilaterally without wheezing or crackles.  Heart: Regular rate and rhythm.  Gastrointestinal: Bowel sounds present, no tenderness to palpation, spleen tip not palpable.  Extremities: No lower extremity edema.  Lymph:  No cervical, clavicular, axillary, epitrochlear, or inguinal lymphadenopathy.  Skin: no skin changes, no new skin rashes.   Performance status: ECOG 0    LABORATORY DATA: Reviewed by me  Recent Labs   Lab Test 07/17/24  1340 03/13/24  0934 11/15/23  0942 07/19/23  0935 03/08/23  0958 11/02/22  1231   .2* 135.1* 105.9* 100.1*   < > 90.3*   HGB 13.8 15.3 15.7 14.5   < > 14.7    150 146* 153   < > 159   ANEU  --  5.4 5.3 2.0   < > 5.4   ALYM  --  128.3* 98.5* 97.1*   < > 83.1*   RETICABSCT  --   --   --   --   --  0.073    < > = values in this interval not displayed.     Recent Labs   Lab Test 03/13/24  0934 11/15/23  0942 07/19/23  0935    139 138   POTASSIUM 5.7* 5.4* 5.5*   CHLORIDE 102 101 104   CO2 27 27 25   BUN 17.4 21.6 15.1   CR 1.29* 1.30* 1.14   PANFILO 9.1 9.7 9.0    178 193     Recent Labs   Lab Test 03/13/24  0934 11/15/23  0942 07/19/23  0935   AST 29 24 28   ALT 12 13 13   ALKPHOS 88 92 76   BILITOTAL 1.2 0.8 1.2     IMPRESSION AND PLAN:  Familia Danielle is a 64 year old with chronic lymphocytic leukemia (CLL).     While the ALC has trended up over the past  couple of years, hemoglobin and platelets remains normal, there is no bothersome/threatening lymphadenopathy or symptoms/complications, and overall there is no urgent indication for CLL treatment. We agreed to continue active surveillance, and he will let us know if anything comes up between visits.     There are no recurrent or active ID issues.  He has received Prevnar 20 and the Shingrix series.  I have recommended an RSV vaccine.  He is up to date for a flu shot and updated COVID-19 vaccine and should get these again in the fall.     His intermittent mild hyperkalemia is very likely pseudohyperkalemia related to leukocytosis/lymphocytosis - no symptoms, renal function good, not on any meds.  He will continue to work with his primary care provider Dr. Leonard Marks for health maintenance and other medical issues.     We will request labs and a visit in about 4 months.  I reminded them to contact us if questions, concerns, or new issues come up between visits.     Total of 30 minutes on patient visit, reviewing records, interpreting test results, placing orders, and documentation on the day of service.    The longitudinal plan of care for the diagnosis(es)/condition(s) as documented were addressed during this visit. Due to the added complexity in care, I will continue to support Familia in the subsequent management and with ongoing continuity of care.    Theodore Carolina MD, PhD  Attending Physician, [facility]  [phone] clinic      Again, thank you for allowing me to participate in the care of your patient.        Sincerely,        Theodore Carolina MD

## 2024-07-17 NOTE — NURSING NOTE
"Oncology Rooming Note    July 17, 2024 1:51 PM   Familia Danielle is a 64 year old male who presents for:    Chief Complaint   Patient presents with    Oncology Clinic Visit     CLL (chronic lymphocytic leukemia)     Blood Draw     Labs drawn via  by RN. VS taken.     Initial Vitals: /75 (BP Location: Right arm, Patient Position: Sitting, Cuff Size: Adult Large)   Pulse 62   Temp 97.8  F (36.6  C) (Oral)   Resp 16   Wt 109.3 kg (240 lb 14.4 oz)   SpO2 97%   BMI 31.15 kg/m   Estimated body mass index is 31.15 kg/m  as calculated from the following:    Height as of 11/2/22: 1.873 m (6' 1.74\").    Weight as of this encounter: 109.3 kg (240 lb 14.4 oz). Body surface area is 2.38 meters squared.  No Pain (0) Comment: Data Unavailable   No LMP for male patient.  Allergies reviewed: Yes  Medications reviewed: Yes    Medications: Medication refills not needed today.  Pharmacy name entered into [facility]: [facility] #[id number] - [address]    Frailty Screening:   Is the patient here for a new oncology consult visit in cancer care? 2. No      Clinical concerns: no other complaints      Collin Flores"

## 2024-10-24 ENCOUNTER — NURSE TRIAGE (OUTPATIENT)
Dept: ONCOLOGY | Facility: CLINIC | Age: 64
End: 2024-10-24
Payer: COMMERCIAL

## 2024-10-24 NOTE — TELEPHONE ENCOUNTER
Pt's Primary Care Clinic is calling to report that they saw pt in clinic yesterday for abdominal pain. They did labs. WBC came back at 118.6 and CRP was 8.8. They recommended he go to the ED, but pt refused saying that his WBC fluctuates between 115-158 and he's feeling fine. Pt told provider that he was supposed to get a CT scan but is waiting for a call from Oncology Clinic to schedule it. (See remainder of note--PCP ordered CT scan, not Dr. Carolina.)    PCP would like call back to let them know if pt should go to ED or if we sent him to the ED.  PCP will fax in visit notes and labs.  Next apt is on 11/20 labs/Dr. Carolina.            8973: Paged Dr. Carolina.  1001: Spoke with Dr. Carolina and he will review pt's chart and see if CT is warrented. This writer will call pt for additional assessment and update Dr. Carolina.   1006: Called pt. Re: abdominal pain. Pt reports that there are two possible causes that he can think of for the onset of the pain:  1) chemical cream ordered by dermatologist--chemo peel; a whole list of SE. Did not think the abdominal pain was related.  2) up to [facility] M-Th, had leftover fish and chips and put in refrigerator and in a cooler on the transport to [facility] w/o any ice, heated it up and ate it (Monday or Tuesday).    Abdominal pain started on Tuesday, went in Wed afternoon and saw PCP at .   Abdominal pain between umbilicus and pelvic bone on mid L side.  Worse when he gest out of a chair. When sneezed 5-7/10. Dull pain.  Notices it when he walks around. 1-2/10; feels it right around belt area now. Not painful if he doesn't touch it.  No problems with urine or stool, very regular. No blood in stool or urine. No n/v. No fevers or chills.  Regular PCP was booked, so gave him to a provider he didn't know at  clinic, and that provider ordered a CT and he is waiting for a call to get that scheduled.    Advised per Dr. Eckfeldt that if pain worsens then he should go to the  ED, but doesn't have to go now if he doesn't feel he needs to go. Informed pt that Dr. Carolina would be reviewing his chart and if he has further recommendations, then this writer would call him back to discuss. Pt voiced understanding.     1017: Updated Dr. Carolina that CT was ordered by PCP and my conversation w/pt.  Updated Primary Care Clinic.  Routed to Care Team.

## 2024-11-25 NOTE — PROGRESS NOTES
REASON FOR VISIT:  Management of chronic lymphocytic leukemia (CLL)  DATE OF VISIT: Nov 27, 2024      HISTORY OF PRESENT ILLNESS:  Familia Danielle is a 64 year old with chronic lymphocytic leukemia (CLL).  To summarize his course, he was incidentally noted to have leukocytosis/lymphocytosis with WBC 57 x 10^9/L with normal hemoglobin and platelets around 02/2021.  There were no recurrent CLL associated cytogenetic abnormalities by FISH.  No obvious lymphadenopathy or splenomegaly.  CT chest abdomen and pelvis in 03/2021 was unremarkable.  Clinically Mehta Stage was 0.  He had mild fatigue but no obvious CLL complications.  He was monitored without treatment.  Lymphocytosis has progressed up to 90 x 10^9/L range while hemoglobin and platelets remained normal.  He had been discussing CLL directed therapy with his previous CLL provider Dr. Deysi Campbell.  We saw him for another opinion and reviewed options and agreed to monitor with active surveillance.  Visit for management of CLL.    INTERVAL HISTORY:  Familia is here for follow up with Jazmine.   He is doing very well. Semi - retired however still doing some projects. Just yesterday he did a large painting project which involved climbing up and down a ladder. Since his last visit with Dr Carolina he has had intermittent left hip pain. This is most notable when he is relaxing (laying in bed with legs extended out). No paresthesias no back pain.   No recent infections or concerning symptoms. Weight and appetite are stable. No headaches or dizziness.   No dyspnea, cough, or chest pain.  Normal bowel function.  No bleeding or unusual bruising.  No new lumps or bumps.     PAST MEDICAL HISTORY:  No major medical history    MEDICATIONS:  Current Outpatient Medications   Medication Sig Dispense Refill    multivitamin w/minerals (MULTI-VITAMIN) tablet       vitamin B complex with vitamin C (VITAMIN  B COMPLEX) tablet       vitamin D3 (CHOLECALCIFEROL) 50 mcg (2000 units) tablet Take 1 tablet  by mouth daily       Current Facility-Administered Medications   Medication Dose Route Frequency Provider Last Rate Last Admin    COVID-19 mRNA vaccine 12+y (COMIRNATY (PFIZER_) injection 30 mcg  30 mcg Intramuscular Once Luciana Zarco, ABDIRIZAK CNP         SOCIAL HISTORY:  Previous tobacco use (chew), quit 2016. 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    PHYSICAL EXAMINATION:  /80   Pulse 65   Temp 97.7  F (36.5  C) (Oral)   Resp 16   Wt 107.8 kg (237 lb 9.6 oz)   SpO2 97%   BMI 30.72 kg/m    General: No acute distress.  HEENT: EOMI, PERRL. Sclerae are anicteric. Oral mucosa is pink and moist with no lesions or thrush.   Lymph: Neck is supple with no lymphadenopathy in the cervical or supraclavicular areas.   Heart: Regular rate and rhythm.   Lungs:   Abdomen: Bowel sounds present, soft, nontender with no palpable hepatosplenomegaly or masses.   Extremities: No lower extremity edema noted bilaterally.   Neuro: Alert and oriented x3, CN grossly intact, steady gait  Skin: No rashes, petechiae, or bruising noted on exposed skin.    Wt Readings from Last 4 Encounters:   11/27/24 107.8 kg (237 lb 9.6 oz)   07/17/24 109.3 kg (240 lb 14.4 oz)   03/13/24 109.7 kg (241 lb 14.4 oz)   11/15/23 103.7 kg (228 lb 9.6 oz)       LABORATORY DATA:  I personally reviewed the following labs:    Most Recent 3 CBC's:  Recent Labs   Lab Test 11/27/24  0726 07/17/24  1340 03/13/24  0934   .4* 115.2* 135.1*   HGB 14.0 13.8 15.3   MCV 99 99 99   * 162 150     Most Recent 3 BMP's:  Recent Labs   Lab Test 11/27/24  0726 07/17/24  1340 03/13/24  0934    139 139   POTASSIUM 5.6* 5.7* 5.7*   CHLORIDE 105 105 102   CO2 25 26 27   BUN 24.4* 21.1 17.4   CR 1.21* 1.18* 1.29*   ANIONGAP 8 8 10   PANFILO 9.0 9.0 9.1   * 102* 104*     Most Recent 2 LFT's:  Recent Labs   Lab Test 11/27/24  0726 07/17/24  1340   AST 30 29   ALT 15 13   ALKPHOS 82 79   BILITOTAL 1.2 0.8         IMPRESSION AND  PLAN:  Familia Danielle is a 64 year old with chronic lymphocytic leukemia (CLL).     CLL--   While the ALC has trended up over the past couple of years, hemoglobin and platelets remains normal, there is no bothersome/threatening lymphadenopathy or symptoms/complications, and overall there is no urgent indication for CLL treatment. We agreed to continue active surveillance, and he will let us know if anything comes up between visits.     ID / Ppx--  There are no recurrent or active ID issues.  He has received Prevnar 20 and the Shingrix series.  I have recommended an RSV vaccine. Dose with 2024 annual flu and updated covid booster today 11/27/24.     Hyperkalemia--   His intermittent mild hyperkalemia is very likely pseudohyperkalemia related to leukocytosis/lymphocytosis - no symptoms, renal function good, not on any meds.  He will continue to work with his primary care provider Dr. Leonard Marks for health maintenance and other medical issues.   - Will collect whole blood potassium at next visit. Has been stable 5.6 to 5.7 range and per lab the tube handling does directly impact this-- must avoid tube system. Labs on 1st floor next visit then can resume 2nd floor lab    Left hip pain  3-4 months, intermittent. No paresthesias. Not impacting daily functions. Will start with plain film--  Addendum-- mild to moderate osteoarthrosis-- no acute intervention. Discussed possible ortho referral for PT, Familia declines at this time.      Plan--   We will request labs and a visit in about 4 months.  I reminded them to contact us if questions, concerns, or new issues come up between visits.         40 minutes spent on the date of the encounter doing chart review, review of test results, interpretation of tests, patient visit, and documentation     The longitudinal plan of care for the diagnosis(es)/condition(s) as documented were addressed during this visit. Due to the added complexity in care, I will continue to support Familia in the  subsequent management and with ongoing continuity of care.    Luciana Zarco ACNP-BC

## 2024-11-27 ENCOUNTER — ANCILLARY PROCEDURE (OUTPATIENT)
Dept: GENERAL RADIOLOGY | Facility: CLINIC | Age: 64
End: 2024-11-27
Attending: NURSE PRACTITIONER
Payer: COMMERCIAL

## 2024-11-27 ENCOUNTER — APPOINTMENT (OUTPATIENT)
Dept: LAB | Facility: CLINIC | Age: 64
End: 2024-11-27
Payer: COMMERCIAL

## 2024-11-27 ENCOUNTER — ONCOLOGY VISIT (OUTPATIENT)
Dept: ONCOLOGY | Facility: CLINIC | Age: 64
End: 2024-11-27
Attending: INTERNAL MEDICINE
Payer: COMMERCIAL

## 2024-11-27 VITALS
WEIGHT: 237.6 LBS | RESPIRATION RATE: 16 BRPM | TEMPERATURE: 97.7 F | OXYGEN SATURATION: 97 % | BODY MASS INDEX: 30.72 KG/M2 | SYSTOLIC BLOOD PRESSURE: 118 MMHG | HEART RATE: 65 BPM | DIASTOLIC BLOOD PRESSURE: 80 MMHG

## 2024-11-27 DIAGNOSIS — M25.552 HIP PAIN, LEFT: ICD-10-CM

## 2024-11-27 DIAGNOSIS — Z23 NEED FOR PROPHYLACTIC VACCINATION AND INOCULATION AGAINST INFLUENZA: ICD-10-CM

## 2024-11-27 DIAGNOSIS — C91.10 CLL (CHRONIC LYMPHOCYTIC LEUKEMIA) (H): ICD-10-CM

## 2024-11-27 DIAGNOSIS — M25.552 HIP PAIN, LEFT: Primary | ICD-10-CM

## 2024-11-27 DIAGNOSIS — E87.5 CHRONIC HYPERKALEMIA: ICD-10-CM

## 2024-11-27 LAB
ALBUMIN SERPL BCG-MCNC: 4.3 G/DL (ref 3.5–5.2)
ALP SERPL-CCNC: 82 U/L (ref 40–150)
ALT SERPL W P-5'-P-CCNC: 15 U/L (ref 0–70)
ANION GAP SERPL CALCULATED.3IONS-SCNC: 8 MMOL/L (ref 7–15)
AST SERPL W P-5'-P-CCNC: 30 U/L (ref 0–45)
BASOPHILS # BLD AUTO: 0.2 10E3/UL (ref 0–0.2)
BASOPHILS NFR BLD AUTO: 0 %
BILIRUB SERPL-MCNC: 1.2 MG/DL
BUN SERPL-MCNC: 24.4 MG/DL (ref 8–23)
CALCIUM SERPL-MCNC: 9 MG/DL (ref 8.8–10.4)
CHLORIDE SERPL-SCNC: 105 MMOL/L (ref 98–107)
CREAT SERPL-MCNC: 1.21 MG/DL (ref 0.67–1.17)
EGFRCR SERPLBLD CKD-EPI 2021: 66 ML/MIN/1.73M2
EOSINOPHIL # BLD AUTO: 0.1 10E3/UL (ref 0–0.7)
EOSINOPHIL NFR BLD AUTO: 0 %
ERYTHROCYTE [DISTWIDTH] IN BLOOD BY AUTOMATED COUNT: 12.9 % (ref 10–15)
GLUCOSE SERPL-MCNC: 115 MG/DL (ref 70–99)
HCO3 SERPL-SCNC: 25 MMOL/L (ref 22–29)
HCT VFR BLD AUTO: 43.2 % (ref 40–53)
HGB BLD-MCNC: 14 G/DL (ref 13.3–17.7)
IMM GRANULOCYTES # BLD: 0.1 10E3/UL
IMM GRANULOCYTES NFR BLD: 0 %
LDH SERPL L TO P-CCNC: 191 U/L (ref 0–250)
LYMPHOCYTES # BLD AUTO: 112.7 10E3/UL (ref 0.8–5.3)
LYMPHOCYTES NFR BLD AUTO: 96 %
MCH RBC QN AUTO: 32.1 PG (ref 26.5–33)
MCHC RBC AUTO-ENTMCNC: 32.4 G/DL (ref 31.5–36.5)
MCV RBC AUTO: 99 FL (ref 78–100)
MONOCYTES # BLD AUTO: 1.6 10E3/UL (ref 0–1.3)
MONOCYTES NFR BLD AUTO: 1 %
NEUTROPHILS # BLD AUTO: 2.7 10E3/UL (ref 1.6–8.3)
NEUTROPHILS NFR BLD AUTO: 2 %
NRBC # BLD AUTO: 0 10E3/UL
NRBC BLD AUTO-RTO: 0 /100
PLAT MORPH BLD: NORMAL
PLATELET # BLD AUTO: 129 10E3/UL (ref 150–450)
POTASSIUM SERPL-SCNC: 5.6 MMOL/L (ref 3.4–5.3)
PROT SERPL-MCNC: 6.6 G/DL (ref 6.4–8.3)
RBC # BLD AUTO: 4.36 10E6/UL (ref 4.4–5.9)
RBC MORPH BLD: NORMAL
SODIUM SERPL-SCNC: 138 MMOL/L (ref 135–145)
WBC # BLD AUTO: 117.4 10E3/UL (ref 4–11)

## 2024-11-27 PROCEDURE — 90662 IIV NO PRSV INCREASED AG IM: CPT | Performed by: NURSE PRACTITIONER

## 2024-11-27 PROCEDURE — 73502 X-RAY EXAM HIP UNI 2-3 VIEWS: CPT | Mod: LT | Performed by: RADIOLOGY

## 2024-11-27 PROCEDURE — G0463 HOSPITAL OUTPT CLINIC VISIT: HCPCS | Performed by: NURSE PRACTITIONER

## 2024-11-27 PROCEDURE — G0008 ADMIN INFLUENZA VIRUS VAC: HCPCS | Performed by: NURSE PRACTITIONER

## 2024-11-27 PROCEDURE — 250N000011 HC RX IP 250 OP 636: Performed by: NURSE PRACTITIONER

## 2024-11-27 PROCEDURE — 83615 LACTATE (LD) (LDH) ENZYME: CPT | Performed by: NURSE PRACTITIONER

## 2024-11-27 PROCEDURE — 90480 ADMN SARSCOV2 VAC 1/ONLY CMP: CPT | Performed by: NURSE PRACTITIONER

## 2024-11-27 PROCEDURE — 80048 BASIC METABOLIC PNL TOTAL CA: CPT | Performed by: NURSE PRACTITIONER

## 2024-11-27 PROCEDURE — 82565 ASSAY OF CREATININE: CPT | Performed by: NURSE PRACTITIONER

## 2024-11-27 PROCEDURE — 36415 COLL VENOUS BLD VENIPUNCTURE: CPT | Performed by: NURSE PRACTITIONER

## 2024-11-27 PROCEDURE — 85004 AUTOMATED DIFF WBC COUNT: CPT | Performed by: NURSE PRACTITIONER

## 2024-11-27 PROCEDURE — 91320 SARSCV2 VAC 30MCG TRS-SUC IM: CPT | Performed by: NURSE PRACTITIONER

## 2024-11-27 RX ADMIN — COVID-19 VACCINE, MRNA 30 MCG: 0.04 INJECTION, SUSPENSION INTRAMUSCULAR at 08:51

## 2024-11-27 RX ADMIN — INFLUENZA A VIRUS A/VICTORIA/4897/2022 IVR-238 (H1N1) ANTIGEN (FORMALDEHYDE INACTIVATED), INFLUENZA A VIRUS A/CALIFORNIA/122/2022 SAN-022 (H3N2) ANTIGEN (FORMALDEHYDE INACTIVATED), AND INFLUENZA B VIRUS B/MICHIGAN/01/2021 ANTIGEN (FORMALDEHYDE INACTIVATED) 0.5 ML: 60; 60; 60 INJECTION, SUSPENSION INTRAMUSCULAR at 08:55

## 2024-11-27 ASSESSMENT — PAIN SCALES - GENERAL: PAINLEVEL_OUTOF10: NO PAIN (0)

## 2024-11-27 NOTE — NURSING NOTE
Flu and COVID vaccines administered into LEFT deltoid without issue.  Advised pt to be observed for 15 minutes post injection. Pt wishes to proceed with XR appt. Pt aware to notify staff should symptoms arise.    BUTCH Slaughter RN

## 2024-11-27 NOTE — LETTER
11/27/2024      Familia Danielle  [address] [facility] [address]      Dear Colleague,    Thank you for referring your patient, Familia Danielle, to the [facility]. Please see a copy of my visit note below.    REASON FOR VISIT:  Management of chronic lymphocytic leukemia (CLL)  DATE OF VISIT: Nov 27, 2024      HISTORY OF PRESENT ILLNESS:  Familia Danielle is a 64 year old with chronic lymphocytic leukemia (CLL).  To summarize his course, he was incidentally noted to have leukocytosis/lymphocytosis with WBC 57 x 10^9/L with normal hemoglobin and platelets around 02/2021.  There were no recurrent CLL associated cytogenetic abnormalities by FISH.  No obvious lymphadenopathy or splenomegaly.  CT chest abdomen and pelvis in 03/2021 was unremarkable.  Clinically Mehta Stage was 0.  He had mild fatigue but no obvious CLL complications.  He was monitored without treatment.  Lymphocytosis has progressed up to 90 x 10^9/L range while hemoglobin and platelets remained normal.  He had been discussing CLL directed therapy with his previous CLL provider Dr. Deysi Campbell.  We saw him for another opinion and reviewed options and agreed to monitor with active surveillance.  Visit for management of CLL.    INTERVAL HISTORY:  Familia is here for follow up with Jazmine.   He is doing very well. Semi - retired however still doing some projects. Just yesterday he did a large painting project which involved climbing up and down a ladder. Since his last visit with Dr Carolina he has had intermittent left hip pain. This is most notable when he is relaxing (laying in bed with legs extended out). No paresthesias no back pain.   No recent infections or concerning symptoms. Weight and appetite are stable. No headaches or dizziness.   No dyspnea, cough, or chest pain.  Normal bowel function.  No bleeding or unusual bruising.  No new lumps or bumps.     PAST MEDICAL HISTORY:  No major medical history    MEDICATIONS:  Current  Outpatient Medications   Medication Sig Dispense Refill     multivitamin w/minerals (MULTI-VITAMIN) tablet        vitamin B complex with vitamin C (VITAMIN  B COMPLEX) tablet        vitamin D3 (CHOLECALCIFEROL) 50 mcg (2000 units) tablet Take 1 tablet by mouth daily       Current Facility-Administered Medications   Medication Dose Route Frequency Provider Last Rate Last Admin     COVID-19 mRNA vaccine 12+y (COMIRNATY (PFIZER_) injection 30 mcg  30 mcg Intramuscular Once Luciana Zarco, APRN CNP         SOCIAL HISTORY:  Previous tobacco use (chew), quit 2016. 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    PHYSICAL EXAMINATION:  /80   Pulse 65   Temp 97.7  F (36.5  C) (Oral)   Resp 16   Wt 107.8 kg (237 lb 9.6 oz)   SpO2 97%   BMI 30.72 kg/m    General: No acute distress.  HEENT: EOMI, PERRL. Sclerae are anicteric. Oral mucosa is pink and moist with no lesions or thrush.   Lymph: Neck is supple with no lymphadenopathy in the cervical or supraclavicular areas.   Heart: Regular rate and rhythm.   Lungs:   Abdomen: Bowel sounds present, soft, nontender with no palpable hepatosplenomegaly or masses.   Extremities: No lower extremity edema noted bilaterally.   Neuro: Alert and oriented x3, CN grossly intact, steady gait  Skin: No rashes, petechiae, or bruising noted on exposed skin.    Wt Readings from Last 4 Encounters:   11/27/24 107.8 kg (237 lb 9.6 oz)   07/17/24 109.3 kg (240 lb 14.4 oz)   03/13/24 109.7 kg (241 lb 14.4 oz)   11/15/23 103.7 kg (228 lb 9.6 oz)       LABORATORY DATA:  I personally reviewed the following labs:    Most Recent 3 CBC's:  Recent Labs   Lab Test 11/27/24  0726 07/17/24  1340 03/13/24  0934   .4* 115.2* 135.1*   HGB 14.0 13.8 15.3   MCV 99 99 99   * 162 150     Most Recent 3 BMP's:  Recent Labs   Lab Test 11/27/24  0726 07/17/24  1340 03/13/24  0934    139 139   POTASSIUM 5.6* 5.7* 5.7*   CHLORIDE 105 105 102   CO2 25 26 27   BUN  24.4* 21.1 17.4   CR 1.21* 1.18* 1.29*   ANIONGAP 8 8 10   PANFILO 9.0 9.0 9.1   * 102* 104*     Most Recent 2 LFT's:  Recent Labs   Lab Test 11/27/24  0726 07/17/24  1340   AST 30 29   ALT 15 13   ALKPHOS 82 79   BILITOTAL 1.2 0.8         IMPRESSION AND PLAN:  Familia Danielle is a 64 year old with chronic lymphocytic leukemia (CLL).     CLL--   While the ALC has trended up over the past couple of years, hemoglobin and platelets remains normal, there is no bothersome/threatening lymphadenopathy or symptoms/complications, and overall there is no urgent indication for CLL treatment. We agreed to continue active surveillance, and he will let us know if anything comes up between visits.     ID / Ppx--  There are no recurrent or active ID issues.  He has received Prevnar 20 and the Shingrix series.  I have recommended an RSV vaccine. Dose with 2024 annual flu and updated covid booster today 11/27/24.     Hyperkalemia--   His intermittent mild hyperkalemia is very likely pseudohyperkalemia related to leukocytosis/lymphocytosis - no symptoms, renal function good, not on any meds.  He will continue to work with his primary care provider Dr. Leonard Marks for health maintenance and other medical issues.   - Will collect whole blood potassium at next visit. Has been stable 5.6 to 5.7 range and per lab the tube handling does directly impact this-- must avoid tube system. Labs on 1st floor next visit then can resume 2nd floor lab    Left hip pain  3-4 months, intermittent. No paresthesias. Not impacting daily functions. Will start with plain film--     Plan--   We will request labs and a visit in about 4 months.  I reminded them to contact us if questions, concerns, or new issues come up between visits.         40 minutes spent on the date of the encounter doing chart review, review of test results, interpretation of tests, patient visit, and documentation     The longitudinal plan of care for the diagnosis(es)/condition(s) as  documented were addressed during this visit. Due to the added complexity in care, I will continue to support Familia in the subsequent management and with ongoing continuity of care.    Luciana Zarco [facility]        Again, thank you for allowing me to participate in the care of your patient.        Sincerely,        ABDIRIZAK Sanchez CNP

## 2024-11-27 NOTE — NURSING NOTE
"Oncology Rooming Note    November 27, 2024 7:35 AM   Familia Danielle is a 64 year old male who presents for:    Chief Complaint   Patient presents with    Blood Draw     Labs drawn via  by RN in lab.  VS taken    Oncology Clinic Visit     RTN for CLL     Initial Vitals: /80   Pulse 65   Temp 97.7  F (36.5  C) (Oral)   Resp 16   Wt 107.8 kg (237 lb 9.6 oz)   SpO2 97%   BMI 30.72 kg/m   Estimated body mass index is 30.72 kg/m  as calculated from the following:    Height as of 11/2/22: 1.873 m (6' 1.74\").    Weight as of this encounter: 107.8 kg (237 lb 9.6 oz). Body surface area is 2.37 meters squared.  No Pain (0) Comment: Data Unavailable   No LMP for male patient.  Allergies reviewed: Yes  Medications reviewed: Yes    Medications: Medication refills not needed today.  Pharmacy name entered into [facility]: [id number] - [place], [place] - [address] [facility] [address]    Frailty Screening:   Is the patient here for a new oncology consult visit in cancer care? 2. No      Clinical concerns: Pt  has pain in his Left hip        Veronica Almanzar MA             "

## 2024-11-27 NOTE — NURSING NOTE
Chief Complaint   Patient presents with    Blood Draw     Labs drawn via  by RN in lab.  VS taken       Labs collected from venipuncture by RN. Vitals taken. Checked in for appointment(s).    Brenda Hitchcock RN

## 2025-03-17 DIAGNOSIS — C91.10 CLL (CHRONIC LYMPHOCYTIC LEUKEMIA) (H): Primary | ICD-10-CM

## 2025-03-17 NOTE — PROGRESS NOTES
REASON FOR VISIT:  Management of chronic lymphocytic leukemia (CLL)    HISTORY OF PRESENT ILLNESS:  Familia Danielle is a 65 year old with chronic lymphocytic leukemia (CLL).  To summarize his course, he was incidentally noted to have leukocytosis/lymphocytosis with WBC 57 x 10^9/L with normal hemoglobin and platelets around 02/2021.  There were no recurrent CLL associated cytogenetic abnormalities by FISH.  No obvious lymphadenopathy or splenomegaly.  CT chest abdomen and pelvis in 03/2021 was unremarkable.  Clinically Mehta Stage was 0.  He had mild fatigue but no obvious CLL complications.  He was monitored without treatment.  Lymphocytosis has progressed up to 90 x 10^9/L range while hemoglobin and platelets remained normal.  He had been discussing CLL directed therapy with his previous CLL provider Dr. Deysi Campbell.  We saw him for another opinion and reviewed options and agreed to monitor with active surveillance.  Visit for management of CLL.    He is here with his REY Lucero.  No fevers or night sweats.  No unintentional weight loss.  No headache or focal weakness or sensory changes.  No dyspnea, cough, or chest pain.  Normal bowel function.  No bleeding or unusual bruising.  No new lumps or bumps.  Got  in October.  No CLL concerns today.    PAST MEDICAL HISTORY:  No major medical history    MEDICATIONS:  Current Outpatient Medications   Medication Sig Dispense Refill    multivitamin w/minerals (MULTI-VITAMIN) tablet       vitamin B complex with vitamin C (VITAMIN  B COMPLEX) tablet       vitamin D3 (CHOLECALCIFEROL) 50 mcg (2000 units) tablet Take 1 tablet by mouth daily       No current facility-administered medications for this visit.     SOCIAL HISTORY:  Previous tobacco use (chew), quit 2016. 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    PHYSICAL EXAMINATION:  /83 (BP Location: Right arm, Patient Position: Sitting, Cuff Size: Adult Regular)   Pulse 63   Temp 97.6  F (36.4  C)  (Oral)   Resp 18   Wt 111 kg (244 lb 11.2 oz)   SpO2 97%   BMI 31.64 kg/m    Wt Readings from Last 5 Encounters:   03/19/25 111 kg (244 lb 11.2 oz)   11/27/24 107.8 kg (237 lb 9.6 oz)   07/17/24 109.3 kg (240 lb 14.4 oz)   03/13/24 109.7 kg (241 lb 14.4 oz)   11/15/23 103.7 kg (228 lb 9.6 oz)     General: Well appearing. No distress.  HEENT: Sclerae anicteric.  Lungs: Clear bilaterally without wheezing or crackles.  Heart: Regular rate and rhythm.  Gastrointestinal: Bowel sounds present, no tenderness to palpation, spleen tip not palpable.  Extremities: No lower extremity edema.  Lymph:  No cervical, clavicular, axillary, epitrochlear, or inguinal lymphadenopathy.  Skin: no skin changes, no new skin rashes.   Performance status: ECOG 0    LABORATORY DATA: Reviewed by me  Recent Labs   Lab Test 03/19/25  0737 11/27/24  0726 07/17/24  1340 03/13/24  0934 11/15/23  0942 03/08/23  0958 11/02/22  1231   .9* 117.4* 115.2* 135.1* 105.9*   < > 90.3*   HGB 14.2 14.0 13.8 15.3 15.7   < > 14.7   * 129* 162 150 146*   < > 159   ANEU 4.1  --  6.9 5.4 5.3   < > 5.4   ANEUTAUTO  --  2.7  --   --   --   --   --    ALYM 129.1*  --  104.8* 128.3* 98.5*   < > 83.1*   ALYMPAUTO  --  112.7*  --   --   --   --   --    RETICABSCT  --   --   --   --   --   --  0.073    < > = values in this interval not displayed.     Recent Labs   Lab Test 11/27/24  0726 07/17/24  1340 03/13/24  0934    139 139   POTASSIUM 5.6* 5.7* 5.7*   CHLORIDE 105 105 102   CO2 25 26 27   BUN 24.4* 21.1 17.4   CR 1.21* 1.18* 1.29*   PANFILO 9.0 9.0 9.1    229 205     Recent Labs   Lab Test 11/27/24  0726 07/17/24  1340 03/13/24  0934   AST 30 29 29   ALT 15 13 12   ALKPHOS 82 79 88   BILITOTAL 1.2 0.8 1.2     IMPRESSION AND PLAN:  Familia Danielle is a 65 year old with chronic lymphocytic leukemia (CLL).     While the ALC trended up in the past, the hemoglobin and platelets remains normal and there have been no bothersome/threatening  lymphadenopathy or symptoms/complications.  Overall, there is still no urgent indication for CLL treatment. We agreed to continue active surveillance, and he will let us know if anything comes up between visits.     There are no recurrent or active ID issues.  He has received Prevnar 20 and the Shingrix series.  I have recommended an RSV vaccine.  He is up to date for the current flu shot and COVID-19 vaccine.     Intermittent mild hyperkalemia is very likely pseudohyperkalemia related to leukocytosis/lymphocytosis - no symptoms, renal function good, not on any meds.  He will continue to work with his primary care provider Dr. Leonard Marks for health maintenance and other medical issues.     We will request labs and a return visit in about 4 months.  I reminded them to contact us if questions, concerns, or new issues come up between visits.     Total of 30 minutes on patient visit, reviewing records, interpreting test results, placing orders, and documentation on the day of service.    The longitudinal plan of care for the diagnosis(es)/condition(s) as documented were addressed during this visit. Due to the added complexity in care, I will continue to support Familia in the subsequent management and with ongoing continuity of care.    Theodore Carolina MD, PhD  Attending Physician, [facility]  [phone] [facility]

## 2025-03-19 ENCOUNTER — APPOINTMENT (OUTPATIENT)
Dept: LAB | Facility: CLINIC | Age: 65
End: 2025-03-19
Attending: INTERNAL MEDICINE
Payer: COMMERCIAL

## 2025-03-19 ENCOUNTER — ONCOLOGY VISIT (OUTPATIENT)
Dept: ONCOLOGY | Facility: CLINIC | Age: 65
End: 2025-03-19
Payer: COMMERCIAL

## 2025-03-19 VITALS
HEART RATE: 63 BPM | BODY MASS INDEX: 31.64 KG/M2 | OXYGEN SATURATION: 97 % | TEMPERATURE: 97.6 F | DIASTOLIC BLOOD PRESSURE: 83 MMHG | SYSTOLIC BLOOD PRESSURE: 136 MMHG | WEIGHT: 244.7 LBS | RESPIRATION RATE: 18 BRPM

## 2025-03-19 DIAGNOSIS — C91.10 CLL (CHRONIC LYMPHOCYTIC LEUKEMIA) (H): Primary | ICD-10-CM

## 2025-03-19 LAB
ALBUMIN SERPL BCG-MCNC: 4.4 G/DL (ref 3.5–5.2)
ALP SERPL-CCNC: 91 U/L (ref 40–150)
ALT SERPL W P-5'-P-CCNC: 15 U/L (ref 0–70)
ANION GAP SERPL CALCULATED.3IONS-SCNC: 9 MMOL/L (ref 7–15)
AST SERPL W P-5'-P-CCNC: 33 U/L (ref 0–45)
BASOPHILS # BLD MANUAL: 0 10E3/UL (ref 0–0.2)
BASOPHILS NFR BLD MANUAL: 0 %
BILIRUB SERPL-MCNC: 0.8 MG/DL
BUN SERPL-MCNC: 17 MG/DL (ref 8–23)
CALCIUM SERPL-MCNC: 9.2 MG/DL (ref 8.8–10.4)
CHLORIDE SERPL-SCNC: 105 MMOL/L (ref 98–107)
CREAT SERPL-MCNC: 1.32 MG/DL (ref 0.67–1.17)
EGFRCR SERPLBLD CKD-EPI 2021: 59 ML/MIN/1.73M2
EOSINOPHIL # BLD MANUAL: 0 10E3/UL (ref 0–0.7)
EOSINOPHIL NFR BLD MANUAL: 0 %
ERYTHROCYTE [DISTWIDTH] IN BLOOD BY AUTOMATED COUNT: 12.8 % (ref 10–15)
GLUCOSE SERPL-MCNC: 114 MG/DL (ref 70–99)
HCO3 SERPL-SCNC: 27 MMOL/L (ref 22–29)
HCT VFR BLD AUTO: 44 % (ref 40–53)
HGB BLD-MCNC: 14.2 G/DL (ref 13.3–17.7)
LDH SERPL L TO P-CCNC: 249 U/L (ref 0–250)
LYMPHOCYTES # BLD MANUAL: 129.1 10E3/UL (ref 0.8–5.3)
LYMPHOCYTES NFR BLD MANUAL: 95 %
MCH RBC QN AUTO: 32.1 PG (ref 26.5–33)
MCHC RBC AUTO-ENTMCNC: 32.3 G/DL (ref 31.5–36.5)
MCV RBC AUTO: 99 FL (ref 78–100)
MONOCYTES # BLD MANUAL: 2.7 10E3/UL (ref 0–1.3)
MONOCYTES NFR BLD MANUAL: 2 %
NEUTROPHILS # BLD MANUAL: 4.1 10E3/UL (ref 1.6–8.3)
NEUTROPHILS NFR BLD MANUAL: 3 %
PLAT MORPH BLD: NORMAL
PLATELET # BLD AUTO: 131 10E3/UL (ref 150–450)
POTASSIUM SERPL-SCNC: ABNORMAL MMOL/L
PROT SERPL-MCNC: 6.7 G/DL (ref 6.4–8.3)
RBC # BLD AUTO: 4.43 10E6/UL (ref 4.4–5.9)
RBC MORPH BLD: NORMAL
SODIUM SERPL-SCNC: 141 MMOL/L (ref 135–145)
WBC # BLD AUTO: 135.9 10E3/UL (ref 4–11)

## 2025-03-19 PROCEDURE — 85007 BL SMEAR W/DIFF WBC COUNT: CPT | Performed by: INTERNAL MEDICINE

## 2025-03-19 PROCEDURE — 82565 ASSAY OF CREATININE: CPT | Performed by: INTERNAL MEDICINE

## 2025-03-19 PROCEDURE — 84155 ASSAY OF PROTEIN SERUM: CPT | Performed by: INTERNAL MEDICINE

## 2025-03-19 PROCEDURE — G0463 HOSPITAL OUTPT CLINIC VISIT: HCPCS | Performed by: INTERNAL MEDICINE

## 2025-03-19 PROCEDURE — 85014 HEMATOCRIT: CPT | Performed by: INTERNAL MEDICINE

## 2025-03-19 PROCEDURE — 83615 LACTATE (LD) (LDH) ENZYME: CPT | Performed by: INTERNAL MEDICINE

## 2025-03-19 PROCEDURE — 36415 COLL VENOUS BLD VENIPUNCTURE: CPT | Performed by: INTERNAL MEDICINE

## 2025-03-19 ASSESSMENT — PAIN SCALES - GENERAL: PAINLEVEL_OUTOF10: NO PAIN (0)

## 2025-03-19 NOTE — NURSING NOTE
Chief Complaint   Patient presents with    Blood Draw     Labs drawn via  by RN in lab, vitals taken.      Labs collected from venipuncture by RN. Vitals taken. Checked in for appointment(s).    Valarie Hitchcock RN

## 2025-03-19 NOTE — NURSING NOTE
"Oncology Rooming Note    March 19, 2025 7:49 AM   Familia Danielle is a 65 year old male who presents for:    Chief Complaint   Patient presents with    Blood Draw     Labs drawn via  by RN in lab, vitals taken.     Oncology Clinic Visit     CLL ( chronic lymphocytic leukemia )     Initial Vitals: /83 (BP Location: Right arm, Patient Position: Sitting, Cuff Size: Adult Regular)   Pulse 63   Temp 97.6  F (36.4  C) (Oral)   Resp 18   Wt 111 kg (244 lb 11.2 oz)   SpO2 97%   BMI 31.64 kg/m   Estimated body mass index is 31.64 kg/m  as calculated from the following:    Height as of 11/2/22: 1.873 m (6' 1.74\").    Weight as of this encounter: 111 kg (244 lb 11.2 oz). Body surface area is 2.4 meters squared.  No Pain (0) Comment: Data Unavailable   No LMP for male patient.  Allergies reviewed: Yes  Medications reviewed: Yes    Medications: Medication refills not needed today.  Pharmacy name entered into [facility]: [facility][id number] - [address]    Frailty Screening:   Is the patient here for a new oncology consult visit in cancer care? 2. No    PHQ9:  Did this patient require a PHQ9?: No      Clinical concerns:        Margarette Cage              "

## 2025-03-20 ENCOUNTER — PATIENT OUTREACH (OUTPATIENT)
Dept: ONCOLOGY | Facility: CLINIC | Age: 65
End: 2025-03-20
Payer: COMMERCIAL

## 2025-03-20 NOTE — PROGRESS NOTES
[facility]: Cancer Care                                                                                          Completed chart audit to update Oncology Care Coordination enrollment status.  Reviewed POC and pt has appropriate follow up scheduled.     KENDRICK HillsN, RN  RN Care Coordinator  [facility]

## 2025-05-24 ENCOUNTER — HEALTH MAINTENANCE LETTER (OUTPATIENT)
Age: 65
End: 2025-05-24

## 2025-06-14 ENCOUNTER — HEALTH MAINTENANCE LETTER (OUTPATIENT)
Age: 65
End: 2025-06-14

## 2025-07-21 PROBLEM — K57.90 DIVERTICULOSIS: Status: ACTIVE | Noted: 2021-03-19

## 2025-07-23 ENCOUNTER — ONCOLOGY VISIT (OUTPATIENT)
Dept: ONCOLOGY | Facility: CLINIC | Age: 65
End: 2025-07-23
Attending: NURSE PRACTITIONER
Payer: COMMERCIAL

## 2025-07-23 VITALS
WEIGHT: 231.9 LBS | TEMPERATURE: 97.9 F | DIASTOLIC BLOOD PRESSURE: 73 MMHG | SYSTOLIC BLOOD PRESSURE: 118 MMHG | OXYGEN SATURATION: 97 % | RESPIRATION RATE: 16 BRPM | BODY MASS INDEX: 29.98 KG/M2 | HEART RATE: 60 BPM

## 2025-07-23 DIAGNOSIS — C91.10 CLL (CHRONIC LYMPHOCYTIC LEUKEMIA) (H): Primary | ICD-10-CM

## 2025-07-23 LAB
ALBUMIN SERPL BCG-MCNC: 4.4 G/DL (ref 3.5–5.2)
ALP SERPL-CCNC: 83 U/L (ref 40–150)
ALT SERPL W P-5'-P-CCNC: 18 U/L (ref 0–70)
ANION GAP SERPL CALCULATED.3IONS-SCNC: 10 MMOL/L (ref 7–15)
AST SERPL W P-5'-P-CCNC: 29 U/L (ref 0–45)
BASOPHILS # BLD MANUAL: 0 10E3/UL (ref 0–0.2)
BASOPHILS NFR BLD MANUAL: 0 %
BILIRUB SERPL-MCNC: 0.9 MG/DL
BUN SERPL-MCNC: 18.2 MG/DL (ref 8–23)
CALCIUM SERPL-MCNC: 9.3 MG/DL (ref 8.8–10.4)
CHLORIDE SERPL-SCNC: 104 MMOL/L (ref 98–107)
CREAT SERPL-MCNC: 1.35 MG/DL (ref 0.67–1.17)
EGFRCR SERPLBLD CKD-EPI 2021: 58 ML/MIN/1.73M2
EOSINOPHIL # BLD MANUAL: 0 10E3/UL (ref 0–0.7)
EOSINOPHIL NFR BLD MANUAL: 0 %
ERYTHROCYTE [DISTWIDTH] IN BLOOD BY AUTOMATED COUNT: 13 % (ref 10–15)
GLUCOSE SERPL-MCNC: 119 MG/DL (ref 70–99)
HCO3 SERPL-SCNC: 26 MMOL/L (ref 22–29)
HCT VFR BLD AUTO: 45.6 % (ref 40–53)
HGB BLD-MCNC: 14.4 G/DL (ref 13.3–17.7)
LDH SERPL L TO P-CCNC: 186 U/L (ref 0–250)
LYMPHOCYTES # BLD MANUAL: 116.3 10E3/UL (ref 0.8–5.3)
LYMPHOCYTES NFR BLD MANUAL: 93 %
MCH RBC QN AUTO: 31.9 PG (ref 26.5–33)
MCHC RBC AUTO-ENTMCNC: 31.6 G/DL (ref 31.5–36.5)
MCV RBC AUTO: 101 FL (ref 78–100)
MONOCYTES # BLD MANUAL: 2.5 10E3/UL (ref 0–1.3)
MONOCYTES NFR BLD MANUAL: 2 %
NEUTROPHILS # BLD MANUAL: 6.3 10E3/UL (ref 1.6–8.3)
NEUTROPHILS NFR BLD MANUAL: 5 %
PLAT MORPH BLD: NORMAL
PLATELET # BLD AUTO: 133 10E3/UL (ref 150–450)
POTASSIUM SERPL-SCNC: 5.6 MMOL/L (ref 3.4–5.3)
PROT SERPL-MCNC: 6.7 G/DL (ref 6.4–8.3)
RBC # BLD AUTO: 4.51 10E6/UL (ref 4.4–5.9)
RBC MORPH BLD: NORMAL
SODIUM SERPL-SCNC: 140 MMOL/L (ref 135–145)
WBC # BLD AUTO: 125 10E3/UL (ref 4–11)

## 2025-07-23 PROCEDURE — G0463 HOSPITAL OUTPT CLINIC VISIT: HCPCS | Performed by: INTERNAL MEDICINE

## 2025-07-23 PROCEDURE — 85027 COMPLETE CBC AUTOMATED: CPT | Performed by: INTERNAL MEDICINE

## 2025-07-23 PROCEDURE — 82310 ASSAY OF CALCIUM: CPT | Performed by: INTERNAL MEDICINE

## 2025-07-23 PROCEDURE — 83615 LACTATE (LD) (LDH) ENZYME: CPT | Performed by: INTERNAL MEDICINE

## 2025-07-23 PROCEDURE — 99214 OFFICE O/P EST MOD 30 MIN: CPT | Mod: GC | Performed by: INTERNAL MEDICINE

## 2025-07-23 PROCEDURE — 36415 COLL VENOUS BLD VENIPUNCTURE: CPT | Performed by: INTERNAL MEDICINE

## 2025-07-23 PROCEDURE — G2211 COMPLEX E/M VISIT ADD ON: HCPCS | Performed by: INTERNAL MEDICINE

## 2025-07-23 PROCEDURE — 85007 BL SMEAR W/DIFF WBC COUNT: CPT | Performed by: INTERNAL MEDICINE

## 2025-07-23 ASSESSMENT — PAIN SCALES - GENERAL: PAINLEVEL_OUTOF10: NO PAIN (0)

## 2025-07-23 NOTE — PROGRESS NOTES
REASON FOR VISIT:  Management of chronic lymphocytic leukemia (CLL)    HISTORY OF PRESENT ILLNESS:  Familia Danielle is a 65 year old with chronic lymphocytic leukemia (CLL).  To summarize his course, he was incidentally noted to have leukocytosis/lymphocytosis with WBC 57 x 10^9/L with normal hemoglobin and platelets around 02/2021.  There were no recurrent CLL associated cytogenetic abnormalities by FISH.  No obvious lymphadenopathy or splenomegaly.  CT chest abdomen and pelvis in 03/2021 was unremarkable.  Clinically Mehta Stage was 0.  He had mild fatigue but no obvious CLL complications.  He was monitored without treatment.  Lymphocytosis has progressed up to 90 x 10^9/L range while hemoglobin and platelets remained normal.  He had been discussing CLL directed therapy with his previous CLL provider Dr. Deysi Campbell.  We saw him for another opinion and reviewed options and agreed to monitor with active surveillance.  Visit for management of CLL.    He is here alone today.  He recently went to his cabin in [place] and was stung by bees. He had quite a severe reaction to the bee sting and lost consciousness. Symptoms resolved with steroids and Benadryl. He did not have respiratory distress or angioedema. No issues since this episode.     From CLL perspective, he is doing well. No complaints. No fevers or night sweats.  No unintentional weight loss.  No headache or focal weakness or sensory changes.  No dyspnea, cough, or chest pain.  Normal bowel function.  No bleeding or unusual bruising.  No new lumps or bumps. No CLL concerns today.    PAST MEDICAL HISTORY:  No major medical history    MEDICATIONS:  Current Outpatient Medications   Medication Sig Dispense Refill    multivitamin w/minerals (MULTI-VITAMIN) tablet       vitamin B complex with vitamin C (VITAMIN  B COMPLEX) tablet       vitamin D3 (CHOLECALCIFEROL) 50 mcg (2000 units) tablet Take 1 tablet by mouth daily       No current facility-administered medications for  this visit.     SOCIAL HISTORY:  Previous tobacco use (chew), quit 2016. 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    PHYSICAL EXAMINATION:  /73   Pulse 60   Temp 97.9  F (36.6  C) (Oral)   Resp 16   Wt 105.2 kg (231 lb 14.4 oz)   SpO2 97%   BMI 29.98 kg/m    Wt Readings from Last 5 Encounters:   07/23/25 105.2 kg (231 lb 14.4 oz)   03/19/25 111 kg (244 lb 11.2 oz)   11/27/24 107.8 kg (237 lb 9.6 oz)   07/17/24 109.3 kg (240 lb 14.4 oz)   03/13/24 109.7 kg (241 lb 14.4 oz)     General: Well appearing. No distress.  HEENT: Sclerae anicteric.  Lungs: Clear bilaterally without wheezing or crackles.  Heart: Regular rate and rhythm.  Gastrointestinal: Bowel sounds present, no tenderness to palpation, spleen tip not palpable.  Extremities: No lower extremity edema.  Lymph:  No cervical, clavicular, axillary, epitrochlear, or inguinal lymphadenopathy.  Skin: no skin changes, no new skin rashes.   Performance status: ECOG 0    LABORATORY DATA: Reviewed by me  Recent Labs   Lab Test 07/23/25  0748 03/19/25  0737 11/27/24  0726 03/08/23  0958 11/02/22  1231   .0* 135.9* 117.4*   < > 90.3*   HGB 14.4 14.2 14.0   < > 14.7   * 131* 129*   < > 159   ANEU 6.3 4.1 2.7   < > 5.4   ALYM 116.3* 129.1* 112.7*   < > 83.1*   RETICABSCT  --   --   --   --  0.073    < > = values in this interval not displayed.     Recent Labs   Lab Test 07/23/25  0748 03/19/25  0737 11/27/24  0726 07/17/24  1340    141 138 139   POTASSIUM 5.6*  --  5.6* 5.7*   CHLORIDE 104 105 105 105   CO2 26 27 25 26   BUN 18.2 17.0 24.4* 21.1   CR 1.35* 1.32* 1.21* 1.18*   PANFILO 9.3 9.2 9.0 9.0    249 191 229     Recent Labs   Lab Test 07/23/25  0748 03/19/25  0737 11/27/24  0726   AST 29 33 30   ALT 18 15 15   ALKPHOS 83 91 82   BILITOTAL 0.9 0.8 1.2     IMPRESSION AND PLAN:  Familia Danielle is a 65 year old with chronic lymphocytic leukemia (CLL).     While the ALC trended up in the past, the hemoglobin and  platelets remains normal and there have been no bothersome/threatening lymphadenopathy or symptoms/complications.  Overall, there is still no urgent indication for CLL treatment. We agreed to continue active surveillance, and he will let us know if anything comes up between visits.     There are no recurrent or active ID issues.  He has received Prevnar 20 and the Shingrix series.  I have recommended an RSV vaccine. He thinks he received this at outside pharmacy, so he will follow up on this. It is currently not in our vaccination records.  He is up to date for the current flu shot and COVID-19 vaccine.     Intermittent mild hyperkalemia is very likely pseudohyperkalemia related to leukocytosis/lymphocytosis - no symptoms, renal function good, not on any meds.  He will continue to work with his primary care provider Dr. Leonard Marks for health maintenance and other medical issues.     We will request labs and a return visit in about 4 months.  I reminded them to contact us if questions, concerns, or new issues come up between visits.     Patient discussed with Dr. Caity Ocasio MD  Heme/Onc Fellow       ATTENDING ATTESTATION:  The patient was seen and evaluated by me.  I have reviewed the vital signs, medications, labs, and imaging results independently, and have discussed the patient and plan with the resident/fellow, and agree with the findings and plan outlined in this note.  The impression and plan were jointly made.    REASON FOR VISIT:  Management of chronic lymphocytic leukemia (CLL)     HISTORY OF PRESENT ILLNESS:  Familia Danielle is a 65 year old with chronic lymphocytic leukemia (CLL).  To summarize his course, he was incidentally noted to have leukocytosis/lymphocytosis with WBC 57 x 10^9/L with normal hemoglobin and platelets around 02/2021.  There were no recurrent CLL associated cytogenetic abnormalities by FISH.  No obvious lymphadenopathy or splenomegaly.  CT chest abdomen and pelvis in 03/2021  was unremarkable.  Clinically Mehta Stage was 0.  He had mild fatigue but no obvious CLL complications.  He was monitored without treatment.  Lymphocytosis has progressed up to 90 x 10^9/L range while hemoglobin and platelets remained normal.  He had been discussing CLL directed therapy with his previous CLL provider Dr. Deysi Campbell.  We saw him for another opinion and reviewed options and agreed to monitor with active surveillance.  Visit for management of CLL.     He is here with his REY Lucero.  No fevers or night sweats.  No unintentional weight loss.  No headache or focal weakness or sensory changes.  No dyspnea, cough, or chest pain.  Normal bowel function.  No bleeding or unusual bruising.  No new lumps or bumps.  Got  in October.  No CLL concerns today.     PAST MEDICAL HISTORY:  No major medical history     SOCIAL HISTORY:  Previous tobacco use (chew), quit 2016. 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     IMPRESSION AND PLAN:  Familia Danielle is a 65 year old with chronic lymphocytic leukemia (CLL).     While the ALC trended up in the past, the hemoglobin and platelets remain acceptable and there have been no bothersome/threatening lymphadenopathy or symptoms/complications.  Overall, there is still no strong indication for CLL treatment. We agreed to continue active surveillance, and he will let us know if anything comes up between visits.     There are no recurrent or active ID issues.  He has received Prevnar 20 and the Shingrix series.  I have recommended an RSV vaccine.  I recommended keeping up to date with the current flu shot and COVID-19 vaccine.     Intermittent mild hyperkalemia is very likely pseudohyperkalemia related to leukocytosis/lymphocytosis - no symptoms, renal function good, not on any meds.  He will continue to work with his primary care provider Dr. Leonard Marks for health maintenance and other medical issues.     We will request labs and a return visit in about  4 months.  I reminded him to contact us if questions, concerns, or new issues come up between visits.     The longitudinal plan of care for the diagnosis(es)/condition(s) as documented were addressed during this visit. Due to the added complexity in care, I will continue to support Familia in the subsequent management and with ongoing continuity of care.     Theodore Carolina MD, PhD  Attending Physician, [facility]  [phone] [facility]

## 2025-07-23 NOTE — LETTER
7/23/2025      Familia Danielle  [address] [facility] [address]      Dear Colleague,    Thank you for referring your patient, Familia Danielle, to the [facility]. Please see a copy of my visit note below.    REASON FOR VISIT:  Management of chronic lymphocytic leukemia (CLL)    HISTORY OF PRESENT ILLNESS:  Familia Danielle is a 65 year old with chronic lymphocytic leukemia (CLL).  To summarize his course, he was incidentally noted to have leukocytosis/lymphocytosis with WBC 57 x 10^9/L with normal hemoglobin and platelets around 02/2021.  There were no recurrent CLL associated cytogenetic abnormalities by FISH.  No obvious lymphadenopathy or splenomegaly.  CT chest abdomen and pelvis in 03/2021 was unremarkable.  Clinically Mehta Stage was 0.  He had mild fatigue but no obvious CLL complications.  He was monitored without treatment.  Lymphocytosis has progressed up to 90 x 10^9/L range while hemoglobin and platelets remained normal.  He had been discussing CLL directed therapy with his previous CLL provider Dr. Deysi Campbell.  We saw him for another opinion and reviewed options and agreed to monitor with active surveillance.  Visit for management of CLL.    He is here alone today.  He recently went to his cabin in [place] and was stung by bees. He had quite a severe reaction to the bee sting and lost consciousness. Symptoms resolved with steroids and Benadryl. He did not have respiratory distress or angioedema. No issues since this episode.     From CLL perspective, he is doing well. No complaints. No fevers or night sweats.  No unintentional weight loss.  No headache or focal weakness or sensory changes.  No dyspnea, cough, or chest pain.  Normal bowel function.  No bleeding or unusual bruising.  No new lumps or bumps. No CLL concerns today.    PAST MEDICAL HISTORY:  No major medical history    MEDICATIONS:  Current Outpatient Medications   Medication Sig Dispense Refill     multivitamin  w/minerals (MULTI-VITAMIN) tablet        vitamin B complex with vitamin C (VITAMIN  B COMPLEX) tablet        vitamin D3 (CHOLECALCIFEROL) 50 mcg (2000 units) tablet Take 1 tablet by mouth daily       No current facility-administered medications for this visit.     SOCIAL HISTORY:  Previous tobacco use (chew), quit 2016. 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    PHYSICAL EXAMINATION:  /73   Pulse 60   Temp 97.9  F (36.6  C) (Oral)   Resp 16   Wt 105.2 kg (231 lb 14.4 oz)   SpO2 97%   BMI 29.98 kg/m    Wt Readings from Last 5 Encounters:   07/23/25 105.2 kg (231 lb 14.4 oz)   03/19/25 111 kg (244 lb 11.2 oz)   11/27/24 107.8 kg (237 lb 9.6 oz)   07/17/24 109.3 kg (240 lb 14.4 oz)   03/13/24 109.7 kg (241 lb 14.4 oz)     General: Well appearing. No distress.  HEENT: Sclerae anicteric.  Lungs: Clear bilaterally without wheezing or crackles.  Heart: Regular rate and rhythm.  Gastrointestinal: Bowel sounds present, no tenderness to palpation, spleen tip not palpable.  Extremities: No lower extremity edema.  Lymph:  No cervical, clavicular, axillary, epitrochlear, or inguinal lymphadenopathy.  Skin: no skin changes, no new skin rashes.   Performance status: ECOG 0    LABORATORY DATA: Reviewed by me  Recent Labs   Lab Test 07/23/25  0748 03/19/25  0737 11/27/24  0726 03/08/23  0958 11/02/22  1231   .0* 135.9* 117.4*   < > 90.3*   HGB 14.4 14.2 14.0   < > 14.7   * 131* 129*   < > 159   ANEU 6.3 4.1 2.7   < > 5.4   ALYM 116.3* 129.1* 112.7*   < > 83.1*   RETICABSCT  --   --   --   --  0.073    < > = values in this interval not displayed.     Recent Labs   Lab Test 07/23/25  0748 03/19/25  0737 11/27/24  0726 07/17/24  1340    141 138 139   POTASSIUM 5.6*  --  5.6* 5.7*   CHLORIDE 104 105 105 105   CO2 26 27 25 26   BUN 18.2 17.0 24.4* 21.1   CR 1.35* 1.32* 1.21* 1.18*   PANFILO 9.3 9.2 9.0 9.0    249 191 229     Recent Labs   Lab Test 07/23/25  0748  03/19/25  0737 11/27/24  0726   AST 29 33 30   ALT 18 15 15   ALKPHOS 83 91 82   BILITOTAL 0.9 0.8 1.2     IMPRESSION AND PLAN:  Familia Danielle is a 65 year old with chronic lymphocytic leukemia (CLL).     While the ALC trended up in the past, the hemoglobin and platelets remains normal and there have been no bothersome/threatening lymphadenopathy or symptoms/complications.  Overall, there is still no urgent indication for CLL treatment. We agreed to continue active surveillance, and he will let us know if anything comes up between visits.     There are no recurrent or active ID issues.  He has received Prevnar 20 and the Shingrix series.  I have recommended an RSV vaccine. He thinks he received this at outside pharmacy, so he will follow up on this. It is currently not in our vaccination records.  He is up to date for the current flu shot and COVID-19 vaccine.     Intermittent mild hyperkalemia is very likely pseudohyperkalemia related to leukocytosis/lymphocytosis - no symptoms, renal function good, not on any meds.  He will continue to work with his primary care provider Dr. Leonard Marks for health maintenance and other medical issues.     We will request labs and a return visit in about 4 months.  I reminded them to contact us if questions, concerns, or new issues come up between visits.     Patient discussed with Dr. Caity Ocasio MD  Heme/Onc Fellow       ATTENDING ATTESTATION:  The patient was seen and evaluated by me.  I have reviewed the vital signs, medications, labs, and imaging results independently, and have discussed the patient and plan with the resident/fellow, and agree with the findings and plan outlined in this note.  The impression and plan were jointly made.    REASON FOR VISIT:  Management of chronic lymphocytic leukemia (CLL)     HISTORY OF PRESENT ILLNESS:  Familia Danielle is a 65 year old with chronic lymphocytic leukemia (CLL).  To summarize his course, he was incidentally noted to have  leukocytosis/lymphocytosis with WBC 57 x 10^9/L with normal hemoglobin and platelets around 02/2021.  There were no recurrent CLL associated cytogenetic abnormalities by FISH.  No obvious lymphadenopathy or splenomegaly.  CT chest abdomen and pelvis in 03/2021 was unremarkable.  Clinically Mehta Stage was 0.  He had mild fatigue but no obvious CLL complications.  He was monitored without treatment.  Lymphocytosis has progressed up to 90 x 10^9/L range while hemoglobin and platelets remained normal.  He had been discussing CLL directed therapy with his previous CLL provider Dr. Deysi Campbell.  We saw him for another opinion and reviewed options and agreed to monitor with active surveillance.  Visit for management of CLL.     He is here with his SO Jazmine.  No fevers or night sweats.  No unintentional weight loss.  No headache or focal weakness or sensory changes.  No dyspnea, cough, or chest pain.  Normal bowel function.  No bleeding or unusual bruising.  No new lumps or bumps.  Got  in October.  No CLL concerns today.     PAST MEDICAL HISTORY:  No major medical history     SOCIAL HISTORY:  Previous tobacco use (chew), quit 2016. 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     IMPRESSION AND PLAN:  Familia Danielle is a 65 year old with chronic lymphocytic leukemia (CLL).     While the ALC trended up in the past, the hemoglobin and platelets remain acceptable and there have been no bothersome/threatening lymphadenopathy or symptoms/complications.  Overall, there is still no strong indication for CLL treatment. We agreed to continue active surveillance, and he will let us know if anything comes up between visits.     There are no recurrent or active ID issues.  He has received Prevnar 20 and the Shingrix series.  I have recommended an RSV vaccine.  I recommended keeping up to date with the current flu shot and COVID-19 vaccine.     Intermittent mild hyperkalemia is very likely pseudohyperkalemia  related to leukocytosis/lymphocytosis - no symptoms, renal function good, not on any meds.  He will continue to work with his primary care provider Dr. Leonard Marks for health maintenance and other medical issues.     We will request labs and a return visit in about 4 months.  I reminded him to contact us if questions, concerns, or new issues come up between visits.     The longitudinal plan of care for the diagnosis(es)/condition(s) as documented were addressed during this visit. Due to the added complexity in care, I will continue to support Familia in the subsequent management and with ongoing continuity of care.     Theodore Carolina MD, PhD  Attending Physician, [facility] [place]  [phone] clinic     Again, thank you for allowing me to participate in the care of your patient.        Sincerely,        Theodore Carolina MD    Electronically signed

## 2025-07-23 NOTE — NURSING NOTE
"Oncology Rooming Note    July 23, 2025 8:00 AM   Familia Danielle is a 65 year old male who presents for:    Chief Complaint   Patient presents with    Blood Draw     Vitals, blood drawn via VPT by LPN. Pt checked into appt.      Initial Vitals: /73   Pulse 60   Temp 97.9  F (36.6  C) (Oral)   Resp 16   Wt 105.2 kg (231 lb 14.4 oz)   SpO2 97%   BMI 29.98 kg/m   Estimated body mass index is 29.98 kg/m  as calculated from the following:    Height as of 11/2/22: 1.873 m (6' 1.74\").    Weight as of this encounter: 105.2 kg (231 lb 14.4 oz). Body surface area is 2.34 meters squared.  No Pain (0) Comment: Data Unavailable   No LMP for male patient.  Allergies reviewed: Yes  Medications reviewed: Yes    Medications: Medication refills not needed today.  Pharmacy name entered into [employer]: [id number] - [facility] [address] [place] [address]    PHQ9:  Did this patient require a PHQ9?: No      Clinical concerns: NONE       Yuki Quan, Clinical Assistant.              "